# Patient Record
Sex: FEMALE | Race: BLACK OR AFRICAN AMERICAN | NOT HISPANIC OR LATINO | Employment: OTHER
[De-identification: names, ages, dates, MRNs, and addresses within clinical notes are randomized per-mention and may not be internally consistent; named-entity substitution may affect disease eponyms.]

---

## 2024-01-31 ENCOUNTER — TRANSCRIBE ORDERS (OUTPATIENT)
Dept: SCHEDULING | Age: 74
End: 2024-01-31
Payer: MEDICARE

## 2024-01-31 DIAGNOSIS — R60.0 LOCALIZED EDEMA: Primary | ICD-10-CM

## 2024-02-06 ENCOUNTER — HOSPITAL ENCOUNTER (EMERGENCY)
Facility: HOSPITAL | Age: 74
Discharge: HOME | End: 2024-02-06
Attending: EMERGENCY MEDICINE
Payer: MEDICARE

## 2024-02-06 ENCOUNTER — APPOINTMENT (OUTPATIENT)
Dept: RADIOLOGY | Facility: HOSPITAL | Age: 74
End: 2024-02-06
Payer: MEDICARE

## 2024-02-06 VITALS
WEIGHT: 140 LBS | BODY MASS INDEX: 21.97 KG/M2 | DIASTOLIC BLOOD PRESSURE: 73 MMHG | TEMPERATURE: 98.3 F | RESPIRATION RATE: 16 BRPM | HEART RATE: 68 BPM | SYSTOLIC BLOOD PRESSURE: 110 MMHG | OXYGEN SATURATION: 100 % | HEIGHT: 67 IN

## 2024-02-06 DIAGNOSIS — S32.010A CLOSED COMPRESSION FRACTURE OF BODY OF L1 VERTEBRA (MULTI): Primary | ICD-10-CM

## 2024-02-06 PROCEDURE — 99284 EMERGENCY DEPT VISIT MOD MDM: CPT | Performed by: EMERGENCY MEDICINE

## 2024-02-06 PROCEDURE — 72100 X-RAY EXAM L-S SPINE 2/3 VWS: CPT | Mod: FOREIGN READ | Performed by: RADIOLOGY

## 2024-02-06 PROCEDURE — 2500000004 HC RX 250 GENERAL PHARMACY W/ HCPCS (ALT 636 FOR OP/ED)

## 2024-02-06 PROCEDURE — 99284 EMERGENCY DEPT VISIT MOD MDM: CPT | Mod: 25 | Performed by: EMERGENCY MEDICINE

## 2024-02-06 PROCEDURE — 2500000001 HC RX 250 WO HCPCS SELF ADMINISTERED DRUGS (ALT 637 FOR MEDICARE OP)

## 2024-02-06 PROCEDURE — 96372 THER/PROPH/DIAG INJ SC/IM: CPT

## 2024-02-06 PROCEDURE — 72100 X-RAY EXAM L-S SPINE 2/3 VWS: CPT

## 2024-02-06 PROCEDURE — 72072 X-RAY EXAM THORAC SPINE 3VWS: CPT

## 2024-02-06 PROCEDURE — 72072 X-RAY EXAM THORAC SPINE 3VWS: CPT | Mod: FOREIGN READ | Performed by: RADIOLOGY

## 2024-02-06 PROCEDURE — 71046 X-RAY EXAM CHEST 2 VIEWS: CPT

## 2024-02-06 PROCEDURE — 71046 X-RAY EXAM CHEST 2 VIEWS: CPT | Mod: FOREIGN READ | Performed by: RADIOLOGY

## 2024-02-06 RX ORDER — ACETAMINOPHEN 325 MG/1
650 TABLET ORAL ONCE
Status: COMPLETED | OUTPATIENT
Start: 2024-02-06 | End: 2024-02-06

## 2024-02-06 RX ORDER — IBUPROFEN 600 MG/1
600 TABLET ORAL EVERY 6 HOURS PRN
Qty: 120 TABLET | Refills: 0 | Status: SHIPPED | OUTPATIENT
Start: 2024-02-06 | End: 2024-03-07

## 2024-02-06 RX ORDER — KETOROLAC TROMETHAMINE 15 MG/ML
INJECTION, SOLUTION INTRAMUSCULAR; INTRAVENOUS
Status: COMPLETED
Start: 2024-02-06 | End: 2024-02-06

## 2024-02-06 RX ORDER — KETOROLAC TROMETHAMINE 15 MG/ML
7.5 INJECTION, SOLUTION INTRAMUSCULAR; INTRAVENOUS ONCE
Status: COMPLETED | OUTPATIENT
Start: 2024-02-06 | End: 2024-02-06

## 2024-02-06 RX ORDER — KETOROLAC TROMETHAMINE 15 MG/ML
7.5 INJECTION, SOLUTION INTRAMUSCULAR; INTRAVENOUS ONCE
Status: DISCONTINUED | OUTPATIENT
Start: 2024-02-06 | End: 2024-02-06

## 2024-02-06 RX ORDER — ACETAMINOPHEN 500 MG
1000 TABLET ORAL EVERY 6 HOURS PRN
Qty: 30 TABLET | Refills: 0 | Status: SHIPPED | OUTPATIENT
Start: 2024-02-06 | End: 2024-02-16

## 2024-02-06 RX ORDER — MELOXICAM 7.5 MG/1
7.5 TABLET ORAL DAILY
Status: DISCONTINUED | OUTPATIENT
Start: 2024-02-06 | End: 2024-02-06 | Stop reason: HOSPADM

## 2024-02-06 RX ADMIN — MELOXICAM 7.5 MG: 7.5 TABLET ORAL at 19:38

## 2024-02-06 RX ADMIN — ACETAMINOPHEN 650 MG: 325 TABLET ORAL at 16:16

## 2024-02-06 RX ADMIN — KETOROLAC TROMETHAMINE 7.5 MG: 15 INJECTION, SOLUTION INTRAMUSCULAR; INTRAVENOUS at 16:17

## 2024-02-06 ASSESSMENT — COLUMBIA-SUICIDE SEVERITY RATING SCALE - C-SSRS
6. HAVE YOU EVER DONE ANYTHING, STARTED TO DO ANYTHING, OR PREPARED TO DO ANYTHING TO END YOUR LIFE?: NO
2. HAVE YOU ACTUALLY HAD ANY THOUGHTS OF KILLING YOURSELF?: NO
1. IN THE PAST MONTH, HAVE YOU WISHED YOU WERE DEAD OR WISHED YOU COULD GO TO SLEEP AND NOT WAKE UP?: NO

## 2024-02-06 NOTE — DISCHARGE INSTRUCTIONS
You are seen the emergency department today for back pain following a fall down stairs at your home.  Chest x-rays revealed a compression fracture of your L1 vertebrae.  You are given pain medication in the department, to which he responded well.  You are also given prescriptions for Motrin and Tylenol to be taken for pain control as prescribed for pain relief.  You are also given contact information for an Ortho spine clinic that you can follow-up with as you wish.  As discussed, use pain control and avoid strenuous activity for the next 2 weeks and please return to the emergency department should you have any new, worsening, concerning symptoms.

## 2024-02-06 NOTE — ED PROVIDER NOTES
HPI   Chief Complaint   Patient presents with   • Fall       HPI  Patient is a 73-year-old female with a past medical history of HFrEF on Eliquis, hypertension, dementia who presents to the emergency department following a fall.  Patient states that she fell down 4 or 5 steps yesterday around 9:45 PM when her dog started fighting and she tried to stop them.  Patient states that she did not hit her head and did not lose consciousness and has no amnesia surrounding the events.  Patient states that she cannot remember exactly striking her back but has pain in the central lower part of her back.  Patient denies any headaches, vision changes, tinnitus, shortness of breath or chest pain, changes in strength or sensation, abdominal pain, nausea or vomiting, fevers.  Patient states that she was able to walk afterwards, albeit gingerly due to the lower back pain.  She did not come into the emergency department immediately but decided to today when her back pain persisted.  She denies any history of back pain or surgeries.  Patient states she has no changes in bowel or urinary habits and has seen no blood in her stool or urine.  Patient does state that she has had a few episodes of shortness of breath due to pain when trying to ambulate.  Patient's daughter accompanies her in the ED and corroborates her story.             [place] Coma Scale Score: 15                     Patient History   History reviewed. No pertinent past medical history.  History reviewed. No pertinent surgical history.  No family history on file.  Social History     Tobacco Use   • Smoking status: Not on file   • Smokeless tobacco: Not on file   Substance Use Topics   • Alcohol use: Not on file   • Drug use: Not on file       Physical Exam   ED Triage Vitals [02/06/24 1242]   Temperature Heart Rate Respirations BP   36.8 °C (98.3 °F) 68 16 110/73      Pulse Ox Temp Source Heart Rate Source Patient Position   100 % Temporal -- --      BP Location FiO2 (%)      Left arm --       Physical Exam  Vitals and nursing note reviewed.   Constitutional:       General: She is not in acute distress.     Appearance: She is well-developed.   HENT:      Head: Normocephalic and atraumatic.   Eyes:      Conjunctiva/sclera: Conjunctivae normal.   Cardiovascular:      Rate and Rhythm: Normal rate and regular rhythm.      Heart sounds: No murmur heard.  Pulmonary:      Effort: Pulmonary effort is normal. No respiratory distress.      Breath sounds: Normal breath sounds.   Abdominal:      Palpations: Abdomen is soft.      Tenderness: There is no abdominal tenderness.   Musculoskeletal:         General: No swelling.      Cervical back: Neck supple. No tenderness.      Thoracic back: No tenderness.      Lumbar back: Tenderness present.      Comments: Tenderness to palpation of the lower lumbar area L3-L5 with no step-offs or deformity noted.   Skin:     General: Skin is warm and dry.      Capillary Refill: Capillary refill takes less than 2 seconds.   Neurological:      Mental Status: She is alert and oriented to person, place, and time.      GCS: GCS eye subscore is 4. GCS verbal subscore is 5. GCS motor subscore is 6.      Cranial Nerves: Cranial nerves 2-12 are intact.      Sensory: Sensation is intact.      Motor: Motor function is intact.   Psychiatric:         Mood and Affect: Mood normal.         Speech: Speech normal.         Behavior: Behavior normal. Behavior is cooperative.         ED Course & MDM   ED Course as of 02/06/24 1819   Tue Feb 06, 2024 1816 XR thoracic spine 3 views [DW]      ED Course User Index  [DW] Ok Theodore DO         Diagnoses as of 02/06/24 1819   Closed compression fracture of body of L1 vertebra (CMS/HCC)       Medical Decision Making  Patient is a 73-year-old female with a past medical history of HFrEF on Eliquis, hypertension, dementia who presents to the emergency department following a fall.  Patient was neurologically intact with no paraspinal  pain, but with significant pain in the central spinous process area of L3-L5 region.  Given no loss of consciousness or head strike along with her neuroexam, is little concern for a head bleed.  An x-ray of her lumbar and thoracic spine was ordered.  Due to patient's shortness of breath with ambulation, a chest x-ray was also ordered.  Patient given to 750 mg Tylenol and 7.5 mg of Toradol for pain control..  Patient's vitals are stable and within normal limits.  Independent interpretation of patient's EKG showed first-degree AV block with a OK interval of 310 ms, a sinus rhythm, normal axis, normal R wave progression, QTc of 458 ms, no evidence of ischemia or infarction and T wave inversions in 3, aVF, V6.  Patient's chest x-ray showed cardiomegaly remains but there does not appear to be any edema or consolidation in the chest.  Patient's x-ray of her lumbar and thoracic spine showed:  There is a mild thoracic scoliosis convex to the right but no acute  abnormalities are seen in the thoracic spine.  There is an approximately 10% compression of the superior endplate of  L1 of uncertain age..  The other vertebrae are preserved in height.   There is disc space narrowing at L3-4 and a grade 1 anterolisthesis of  L4 on L5.  Patient given a dose of Mobic for further pain control to discharge as she will not be able to  prescriptions before tomorrow.  Patient informed of her compression fracture and a discussion was had about pain control and reducing activity along with being careful at home.  Patient discharged with strict return precautions and with prescriptions for Motrin and Tylenol for pain control.  Patient also given information for Ortho spine clinic for further follow-up.  Patient understood and was agreeable with the plan.    Procedure  Procedures none     Ok Theodore DO  Resident  02/06/24 4992

## 2024-02-06 NOTE — ED TRIAGE NOTES
Pt had a fall last night down 4 steps while taking her dog out. Pt +thinners (eliquis), -LOC. Was witnessed unknown if hit head. No trauma activation per DR Andersen. VSS in triage. Pt at baseline mentation.

## 2024-02-20 ENCOUNTER — APPOINTMENT (OUTPATIENT)
Dept: RADIOLOGY | Facility: HOSPITAL | Age: 74
End: 2024-02-20
Payer: MEDICARE

## 2024-02-20 ENCOUNTER — HOSPITAL ENCOUNTER (OUTPATIENT)
Facility: HOSPITAL | Age: 74
Setting detail: OBSERVATION
Discharge: HOME | End: 2024-02-21
Attending: EMERGENCY MEDICINE | Admitting: EMERGENCY MEDICINE
Payer: MEDICARE

## 2024-02-20 DIAGNOSIS — I10 PRIMARY HYPERTENSION: ICD-10-CM

## 2024-02-20 DIAGNOSIS — R26.2 DIFFICULTY IN WALKING: ICD-10-CM

## 2024-02-20 DIAGNOSIS — Z87.891 FORMER SMOKER: ICD-10-CM

## 2024-02-20 DIAGNOSIS — Z79.01 ANTICOAGULATED: ICD-10-CM

## 2024-02-20 DIAGNOSIS — F03.A0 MILD DEMENTIA WITHOUT BEHAVIORAL DISTURBANCE, PSYCHOTIC DISTURBANCE, MOOD DISTURBANCE, OR ANXIETY, UNSPECIFIED DEMENTIA TYPE (MULTI): ICD-10-CM

## 2024-02-20 DIAGNOSIS — S32.010D COMPRESSION FRACTURE OF L1 VERTEBRA WITH ROUTINE HEALING, SUBSEQUENT ENCOUNTER: Primary | ICD-10-CM

## 2024-02-20 DIAGNOSIS — Z91.81 AT RISK FOR FALL DUE TO COMORBID CONDITION: ICD-10-CM

## 2024-02-20 DIAGNOSIS — E78.5 HYPERLIPIDEMIA, UNSPECIFIED HYPERLIPIDEMIA TYPE: ICD-10-CM

## 2024-02-20 PROBLEM — S32.000A LUMBAR COMPRESSION FRACTURE, CLOSED, INITIAL ENCOUNTER (MULTI): Status: ACTIVE | Noted: 2024-02-20

## 2024-02-20 PROBLEM — M54.50 BACK PAIN AT L4-L5 LEVEL: Status: ACTIVE | Noted: 2024-02-20

## 2024-02-20 PROCEDURE — 72100 X-RAY EXAM L-S SPINE 2/3 VWS: CPT

## 2024-02-20 PROCEDURE — 2500000001 HC RX 250 WO HCPCS SELF ADMINISTERED DRUGS (ALT 637 FOR MEDICARE OP): Mod: SE

## 2024-02-20 PROCEDURE — G0378 HOSPITAL OBSERVATION PER HR: HCPCS

## 2024-02-20 PROCEDURE — 99284 EMERGENCY DEPT VISIT MOD MDM: CPT | Performed by: EMERGENCY MEDICINE

## 2024-02-20 PROCEDURE — 72100 X-RAY EXAM L-S SPINE 2/3 VWS: CPT | Performed by: RADIOLOGY

## 2024-02-20 PROCEDURE — 2500000002 HC RX 250 W HCPCS SELF ADMINISTERED DRUGS (ALT 637 FOR MEDICARE OP, ALT 636 FOR OP/ED): Mod: SE | Performed by: PHYSICIAN ASSISTANT

## 2024-02-20 PROCEDURE — 2500000001 HC RX 250 WO HCPCS SELF ADMINISTERED DRUGS (ALT 637 FOR MEDICARE OP): Mod: SE | Performed by: PHYSICIAN ASSISTANT

## 2024-02-20 PROCEDURE — 2500000005 HC RX 250 GENERAL PHARMACY W/O HCPCS: Mod: SE

## 2024-02-20 PROCEDURE — 99285 EMERGENCY DEPT VISIT HI MDM: CPT

## 2024-02-20 RX ORDER — IBUPROFEN 600 MG/1
600 TABLET ORAL EVERY 6 HOURS PRN
Status: DISCONTINUED | OUTPATIENT
Start: 2024-02-20 | End: 2024-02-21 | Stop reason: HOSPADM

## 2024-02-20 RX ORDER — POLYETHYLENE GLYCOL 3350 17 G/17G
17 POWDER, FOR SOLUTION ORAL DAILY
Status: DISCONTINUED | OUTPATIENT
Start: 2024-02-21 | End: 2024-02-21 | Stop reason: HOSPADM

## 2024-02-20 RX ORDER — VARENICLINE TARTRATE 1 MG/1
1 TABLET, FILM COATED ORAL EVERY MORNING
Status: COMPLETED | OUTPATIENT
Start: 2024-02-21 | End: 2024-02-21

## 2024-02-20 RX ORDER — AMIODARONE HYDROCHLORIDE 200 MG/1
200 TABLET ORAL EVERY MORNING
Status: DISCONTINUED | OUTPATIENT
Start: 2024-02-21 | End: 2024-02-21 | Stop reason: HOSPADM

## 2024-02-20 RX ORDER — CYCLOBENZAPRINE HCL 10 MG
10 TABLET ORAL ONCE
Status: COMPLETED | OUTPATIENT
Start: 2024-02-20 | End: 2024-02-20

## 2024-02-20 RX ORDER — LOSARTAN POTASSIUM 50 MG/1
100 TABLET ORAL EVERY MORNING
Status: DISCONTINUED | OUTPATIENT
Start: 2024-02-21 | End: 2024-02-21 | Stop reason: HOSPADM

## 2024-02-20 RX ORDER — ATORVASTATIN CALCIUM 80 MG/1
80 TABLET, FILM COATED ORAL NIGHTLY
Status: DISCONTINUED | OUTPATIENT
Start: 2024-02-20 | End: 2024-02-21 | Stop reason: HOSPADM

## 2024-02-20 RX ORDER — MIRTAZAPINE 15 MG/1
7.5 TABLET, FILM COATED ORAL NIGHTLY
Status: DISCONTINUED | OUTPATIENT
Start: 2024-02-20 | End: 2024-02-21 | Stop reason: HOSPADM

## 2024-02-20 RX ORDER — FUROSEMIDE 20 MG/1
20 TABLET ORAL EVERY MORNING
Status: DISCONTINUED | OUTPATIENT
Start: 2024-02-21 | End: 2024-02-21 | Stop reason: HOSPADM

## 2024-02-20 RX ORDER — KETOROLAC TROMETHAMINE 10 MG/1
10 TABLET, FILM COATED ORAL ONCE
Status: COMPLETED | OUTPATIENT
Start: 2024-02-20 | End: 2024-02-20

## 2024-02-20 RX ORDER — HYDRALAZINE HYDROCHLORIDE 25 MG/1
50 TABLET, FILM COATED ORAL 3 TIMES DAILY
Status: DISCONTINUED | OUTPATIENT
Start: 2024-02-20 | End: 2024-02-21 | Stop reason: HOSPADM

## 2024-02-20 RX ORDER — LIDOCAINE 560 MG/1
1 PATCH PERCUTANEOUS; TOPICAL; TRANSDERMAL DAILY
Status: DISCONTINUED | OUTPATIENT
Start: 2024-02-20 | End: 2024-02-21 | Stop reason: HOSPADM

## 2024-02-20 RX ORDER — SERTRALINE HYDROCHLORIDE 50 MG/1
50 TABLET, FILM COATED ORAL NIGHTLY
Status: DISCONTINUED | OUTPATIENT
Start: 2024-02-20 | End: 2024-02-21 | Stop reason: HOSPADM

## 2024-02-20 RX ADMIN — KETOROLAC TROMETHAMINE 10 MG: 10 TABLET, FILM COATED ORAL at 18:50

## 2024-02-20 RX ADMIN — SERTRALINE HYDROCHLORIDE 50 MG: 50 TABLET ORAL at 22:55

## 2024-02-20 RX ADMIN — LIDOCAINE 1 PATCH: 4 PATCH TOPICAL at 18:49

## 2024-02-20 RX ADMIN — CYCLOBENZAPRINE 10 MG: 10 TABLET, FILM COATED ORAL at 18:50

## 2024-02-20 RX ADMIN — HYDRALAZINE HYDROCHLORIDE 50 MG: 25 TABLET ORAL at 22:55

## 2024-02-20 RX ADMIN — IBUPROFEN 600 MG: 600 TABLET ORAL at 22:56

## 2024-02-20 RX ADMIN — ATORVASTATIN CALCIUM 80 MG: 80 TABLET, FILM COATED ORAL at 22:56

## 2024-02-20 ASSESSMENT — ENCOUNTER SYMPTOMS
DIZZINESS: 0
RESPIRATORY NEGATIVE: 1
TROUBLE SWALLOWING: 0
COUGH: 0
SHORTNESS OF BREATH: 0
EYE PAIN: 0
GASTROINTESTINAL NEGATIVE: 1
AGITATION: 0
FEVER: 0
BACK PAIN: 1
SORE THROAT: 0
EYES NEGATIVE: 1
SPEECH DIFFICULTY: 0
NEUROLOGICAL NEGATIVE: 1
RHINORRHEA: 0
CONFUSION: 1
WEAKNESS: 0
DYSPHORIC MOOD: 0
CONSTIPATION: 0
NUMBNESS: 0
HEADACHES: 0
VOICE CHANGE: 0
NECK PAIN: 0
VOMITING: 0
CARDIOVASCULAR NEGATIVE: 1
CHEST TIGHTNESS: 0
CONSTITUTIONAL NEGATIVE: 1
DIARRHEA: 0
ABDOMINAL PAIN: 0
NAUSEA: 0
UNEXPECTED WEIGHT CHANGE: 0

## 2024-02-20 ASSESSMENT — LIFESTYLE VARIABLES
HAVE YOU EVER FELT YOU SHOULD CUT DOWN ON YOUR DRINKING: NO
EVER HAD A DRINK FIRST THING IN THE MORNING TO STEADY YOUR NERVES TO GET RID OF A HANGOVER: NO
HAVE PEOPLE ANNOYED YOU BY CRITICIZING YOUR DRINKING: NO
EVER FELT BAD OR GUILTY ABOUT YOUR DRINKING: NO

## 2024-02-20 ASSESSMENT — COLUMBIA-SUICIDE SEVERITY RATING SCALE - C-SSRS
1. IN THE PAST MONTH, HAVE YOU WISHED YOU WERE DEAD OR WISHED YOU COULD GO TO SLEEP AND NOT WAKE UP?: NO
6. HAVE YOU EVER DONE ANYTHING, STARTED TO DO ANYTHING, OR PREPARED TO DO ANYTHING TO END YOUR LIFE?: NO
2. HAVE YOU ACTUALLY HAD ANY THOUGHTS OF KILLING YOURSELF?: NO

## 2024-02-20 NOTE — ED TRIAGE NOTES
Patient came to ED with c/o lower back and buttock pain. Patient had mechanical fall 3 weeks ago, where her dog dragged her down stairs. Patient was seen at the time of fall and discharged with no mention of any fractures. Patient has been taking prescribed pain medication with no relief. No additional complaints. Patient not on thinners. EMS states patient was hypertensive en route.

## 2024-02-21 ENCOUNTER — HOME HEALTH ADMISSION (OUTPATIENT)
Dept: HOME HEALTH SERVICES | Facility: HOME HEALTH | Age: 74
End: 2024-02-21

## 2024-02-21 ENCOUNTER — DOCUMENTATION (OUTPATIENT)
Dept: HOME HEALTH SERVICES | Facility: HOME HEALTH | Age: 74
End: 2024-02-21

## 2024-02-21 VITALS
RESPIRATION RATE: 17 BRPM | SYSTOLIC BLOOD PRESSURE: 173 MMHG | OXYGEN SATURATION: 100 % | TEMPERATURE: 96.4 F | DIASTOLIC BLOOD PRESSURE: 70 MMHG | HEART RATE: 55 BPM

## 2024-02-21 PROCEDURE — 97161 PT EVAL LOW COMPLEX 20 MIN: CPT | Mod: GP | Performed by: PHYSICAL THERAPIST

## 2024-02-21 PROCEDURE — 2500000002 HC RX 250 W HCPCS SELF ADMINISTERED DRUGS (ALT 637 FOR MEDICARE OP, ALT 636 FOR OP/ED): Performed by: PHYSICIAN ASSISTANT

## 2024-02-21 PROCEDURE — 2500000001 HC RX 250 WO HCPCS SELF ADMINISTERED DRUGS (ALT 637 FOR MEDICARE OP): Mod: SE | Performed by: PHYSICIAN ASSISTANT

## 2024-02-21 PROCEDURE — G0378 HOSPITAL OBSERVATION PER HR: HCPCS

## 2024-02-21 PROCEDURE — 2500000005 HC RX 250 GENERAL PHARMACY W/O HCPCS: Mod: SE | Performed by: PHYSICIAN ASSISTANT

## 2024-02-21 RX ORDER — LIDOCAINE 50 MG/G
1 PATCH TOPICAL DAILY PRN
Qty: 20 PATCH | Refills: 0 | Status: SHIPPED | OUTPATIENT
Start: 2024-02-21 | End: 2024-03-12

## 2024-02-21 RX ORDER — CYCLOBENZAPRINE HCL 10 MG
10 TABLET ORAL NIGHTLY PRN
Qty: 20 TABLET | Refills: 0 | Status: SHIPPED | OUTPATIENT
Start: 2024-02-21 | End: 2024-03-12

## 2024-02-21 RX ADMIN — HYDRALAZINE HYDROCHLORIDE 50 MG: 25 TABLET ORAL at 09:45

## 2024-02-21 RX ADMIN — LIDOCAINE 1 PATCH: 4 PATCH TOPICAL at 09:41

## 2024-02-21 RX ADMIN — APIXABAN 5 MG: 5 TABLET, FILM COATED ORAL at 09:46

## 2024-02-21 RX ADMIN — LOSARTAN POTASSIUM 100 MG: 50 TABLET, FILM COATED ORAL at 09:42

## 2024-02-21 RX ADMIN — AMIODARONE HYDROCHLORIDE 200 MG: 200 TABLET ORAL at 11:36

## 2024-02-21 RX ADMIN — FUROSEMIDE 20 MG: 20 TABLET ORAL at 09:47

## 2024-02-21 RX ADMIN — VARENICLINE TARTRATE 1 MG: 1 TABLET, FILM COATED ORAL at 09:46

## 2024-02-21 ASSESSMENT — COGNITIVE AND FUNCTIONAL STATUS - GENERAL
MOVING TO AND FROM BED TO CHAIR: A LITTLE
CLIMB 3 TO 5 STEPS WITH RAILING: A LOT
MOBILITY SCORE: 17
STANDING UP FROM CHAIR USING ARMS: A LITTLE
WALKING IN HOSPITAL ROOM: A LITTLE
MOVING FROM LYING ON BACK TO SITTING ON SIDE OF FLAT BED WITH BEDRAILS: A LITTLE
TURNING FROM BACK TO SIDE WHILE IN FLAT BAD: A LITTLE

## 2024-02-21 ASSESSMENT — PAIN SCALES - GENERAL
PAINLEVEL_OUTOF10: 7
PAINLEVEL_OUTOF10: 7

## 2024-02-21 ASSESSMENT — PAIN DESCRIPTION - LOCATION: LOCATION: BACK

## 2024-02-21 ASSESSMENT — ACTIVITIES OF DAILY LIVING (ADL): ADL_ASSISTANCE: INDEPENDENT

## 2024-02-21 ASSESSMENT — PAIN - FUNCTIONAL ASSESSMENT
PAIN_FUNCTIONAL_ASSESSMENT: 0-10
PAIN_FUNCTIONAL_ASSESSMENT: 0-10

## 2024-02-21 NOTE — PROGRESS NOTES
I was consulted by provider to arrange home health care for PT services. I spoke to the patient at bedside. The patient was agreeable to home health care, but does not remember who her PCP is or who her insurance is through. The patient told me to call her daughter Giovanni at [phone]. I called the daughter. The daughter informed me her mom has home care already through [facility]. I called [facility] liaison Lizette and she confirmed the patient's enrollment. I asked if PT services can be added to her home care. Lizette stated their PT services will evaluate the patient. Lizette requested discharge summary faxed to them at [phone]. Summary faxed to them.      Lew Brown RN

## 2024-02-21 NOTE — ED PROVIDER NOTES
Disposition Note  [facility] CLINICAL DECISION  Patient: Casie Obrien  MRN: [id number]  : 1950  Date of Evaluation: 2024  ED Provider: Jay Salazar PA-C      Limitations to history: None  Independent Historian: Yes  External Records Reviewed: Recent and relevant inpatient and outpatient notes and EMR      Subjective:    Casie Obrien is a 73 y.o. female has undergone comprehensive diagnostic evaluation and therapeutic management in accordance with the CDU guidelines for Lower back pain. Based on Mrs. Obrien`s clinical response and diagnostic information during this period of observation, it has been determined that the patient will be {discharged.    The acute evaluation included:  Orders Placed This Encounter   Procedures    XR lumbar spine 2-3 views    Referral to Home Care    Referral to Healthy at Home Program    Adult diet Regular    Activity (specify) No Restrictions    Vital Signs    Notify provider (specify parameters)    Pain Assessment    Weight on admission    Height on admission    Full code    PT eval and treat    Electrocardiogram, 12-lead PRN ACS symptoms    Insert peripheral IV    Saline lock IV    Send to CDU    Initiate observation status         Placed in observation at: 2125       Past History   No past medical history on file.  No past surgical history on file.  Social History     Socioeconomic History    Marital status: Single     Spouse name: Not on file    Number of children: Not on file    Years of education: Not on file    Highest education level: Not on file   Occupational History    Not on file   Tobacco Use    Smoking status: Not on file    Smokeless tobacco: Not on file   Substance and Sexual Activity    Alcohol use: Not on file    Drug use: Not on file    Sexual activity: Not on file   Other Topics Concern    Not on file   Social History Narrative    Not on file     Social Determinants of Health     Financial Resource Strain: Not on file   Food  Insecurity: Not on file   Transportation Needs: Not on file   Physical Activity: Not on file   Stress: Not on file   Social Connections: Not on file   Intimate Partner Violence: Not on file   Housing Stability: Not on file         Medications/Allergies     Previous Medications    IBUPROFEN 600 MG TABLET    Take 1 tablet (600 mg) by mouth every 6 hours if needed for mild pain (1 - 3).     No Known Allergies      Review of Systems  All systems reviewed and otherwise negative, except as stated above in HPI.    Diagnostics reviewed by Jay Salazar PA-C     Labs:  No results found for this or any previous visit.  Radiographs:  XR lumbar spine 2-3 views   Final Result   Compression deformity at L1 appears mildly progressed since prior   imaging 02/06/2024 with minimal osseous retropulsion as detailed.        Demineralization and degenerative changes again present.        Vascular calcifications.             MACRO:   None        Signed by: Kalpana Preston 2/20/2024 7:43 PM   Dictation workstation:   [id number]              Physical Exam     Visit Vitals  BP (!) 185/71   Pulse 54   Temp 35.8 °C (96.4 °F) (Temporal)   Resp 16   SpO2 97%       Physical exam  VS: As documented in the triage note and EMR flowsheet from this visit were reviewed.    General: Patient is AAOx3, appears well developed, well nourished, poor historian, intermittently answers questions appropriately    HEENT: head normocephalic, atraumatic, PERRLA, EOMs intact, oropharynx without erythema or exudate, buccal mucosa intact without lesions, nose is patent bilateral    Neck: supple, full ROM, negative for lymphadenopathy, JVD, thyromegaly, tracheal deviation, nuchal rigidity    Pulmonary: Clear to auscultation bilaterally, No wheezing, rales, or rhonchi, no accessory muscle use, able to speak full clear sentences    Cardiac: Normal rate and rhythm, no murmurs, rubs or gallops    GI: soft, non-tender, non-distended, normoactive bowelsounds in all four  quadrants, no masses or organomegaly, no guarding or CVA tenderness noted    Musculoskeletal: full weight bearing, TATE, no joint effusions, clubbing or edema noted    Skin: Warm, dry, intact, no lesions or rashes noted, turgor is good.    Neuro: patient follow commands, cranial nerves 2-12 grossly intact, motor strengths 5/5 upper and lower extremities, sensation are symmetrical. No focal deficits.    Psych: Appropriate mood and affect for situation      Consultants  1) N/A      Impression and Plan    In summary, Casie Obrien has been cared for according to the standard [facility] Clinical Decision Unit observation protocol for Back pain at L4-L5 level. This extended period of observation was specifically required to determine the need for hospitalization. Prior to discharge from observation, the final physical exam is documented above.     Significant events during the course of observation based on the goals of the clinical problem list include:   1) Remained stable    Based on the patient's condition and test results, the patient will be discharged    Total length of observation was 19 hours. Dr. Taylor is the CDU disposition attending.      Discharge Diagnosis  Back pain at L4-L5 level    Issues Requiring Follow-Up  See above    Discharge Meds     Your medication list        ASK your doctor about these medications        Instructions Last Dose Given Next Dose Due   acetaminophen 500 mg tablet  Commonly known as: Tylenol  Ask about: Should I take this medication?      Take 2 tablets (1,000 mg) by mouth every 6 hours if needed for mild pain (1 - 3) for up to 10 days.       ibuprofen 600 mg tablet      Take 1 tablet (600 mg) by mouth every 6 hours if needed for mild pain (1 - 3).                Test Results Pending At Discharge  Pending Labs       No current pending labs.            Hospital Course   Mrs. Obrien was admitted to the clinical decision unit for pain management and physical  therapy evaluation.  Patient has history compression L1 fracture was sustained 2 weeks ago and has been experiencing persistent pain at home.  Medical workup included plain radiograph of the lower back which revealed slight worsening/progression of previous noted L1 compression fracture on 2/6/2024.  She was treated with cyclobenzaprine, lidocaine patch and ketorolac.  Physical therapy evaluated Mrs. Obrien this morning and recommended low intensity care.  ED  engaged the patient and was able to facilitate a home care referral for PT.  Mrs. Obrien has otherwise remained clinically, hemodynamically and neurologically intact.  Plan to discharge home with instructions to follow-up with her PCP as instructed.  Patient will be advised to return to the emergency department with new or worsening symptoms or for any other concerns.  Plan of care discussed with Mrs. Obrien and her daughter who is here at the bedside and they verbalized understanding and is in agreement and will be discharged in stable condition    Outpatient Follow-Up  Future Appointments   Date Time Provider Department Center   3/22/2024  2:30 PM [facility] 1 [facility]   4/10/2024  8:30 AM Marisa Bello PA-C [id number] [facility]   4/12/2024  9:00 AM [facility] MAMMO 3 [facility]   4/29/2024 11:30 AM [facility] ECHO 3 [id number] [facility]         MARIBEL Ayers PA-C  02/21/24 1706

## 2024-02-21 NOTE — PROGRESS NOTES
Physical Therapy    Physical Therapy Evaluation    Patient Name: Casie Obrien  MRN: [id number]  Today's Date: 2/21/2024   Time Calculation  Start Time: 0947  Stop Time: 1005  Time Calculation (min): 18 min    Assessment/Plan   PT Assessment  PT Assessment Results: Decreased mobility, Impaired balance, Decreased endurance, Decreased strength  Rehab Prognosis: Good  Evaluation/Treatment Tolerance: Patient tolerated treatment well  End of Session Communication: Bedside nurse  Assessment Comment: Patient presents to PT for evaluation s/p fall and L1 compression fx. Patient was CGA for mobility, exhibited reduced activity tolerance, and demonstrated need for inc cues. Patient is appropriate for continued skilled PT while in house to address previously mentioned impairments and work toward inc functional independence and endurance.     End of Session Patient Position: Bed, 2 rail up, Alarm off, not on at start of session  IP OR SWING BED PT PLAN  Inpatient or Swing Bed: Inpatient  PT Plan  Treatment/Interventions: Bed mobility, Transfer training, Gait training, Stair training, Balance training, Endurance training, Therapeutic exercise, Therapeutic activity, Home exercise program  PT Frequency: 3 times per week  PT Discharge Recommendations: Low intensity level of continued care  PT Recommended Transfer Status: Assist x1  PT - OK to Discharge: Yes      Subjective   General Visit Information:  Reason for Referral: low back pain due to fall that resulted in compression fracture of L1 on 02/06/24  Past Medical History Relevant to Rehab: dementia, HTN, CHF, and HLD  Prior to Session Communication: Bedside nurse  Patient Position Received: Bed, 2 rail up, Alarm off, not on at start of session, Alarm off, caregiver present  General Comment: patient with intermittent confusion, but able to follow commands. BP elevated (see below) nsg informed (just gave pt BP meds) and letting care team know.   Home Living:  Home Living  Lives  With: Siblings (reported living with her sister, and daughter helps at times)  Home Adaptive Equipment: Quad cane  Home Layout: Two level, Bed/bath upstairs  Home Access: Stairs to enter with rails  Entrance Stairs-Number of Steps: 6  Bathroom Equipment: None  Home Living Comments: +recent fall, does not drive  Prior Level of Function:  Prior Function Per Pt/Caregiver Report  Level of [place]: Independent with ADLs and functional transfers (shares household tasks with sister, but sister does most per patient)  Receives Help From: Family  ADL Assistance: Independent  Homemaking Assistance: Needs assistance  Ambulatory Assistance:  (use of QC for ambulation)  Gait: Assistive device  Precautions:     Vital Signs:  Vital Signs  BP: (!) 235/85 (197/80)  BP Location: Right arm  Patient Position: Lying    Objective   Lines/Tubes/Drains:     Continuous Medications/Drips:     Oxygen:    Pain:  Pain Assessment  Pain Assessment: 0-10  Pain Score: 7  Pain Location: Back  Cognition:  Cognition  Overall Cognitive Status:  (intermittent confusion)  Processing Speed: Delayed      Extremity/Trunk Assessments:  Strength:  Strength Comments: WFL  RUE   RUE : Within Functional Limits  LUE   LUE: Within Functional Limits  RLE   RLE : Within Functional Limits  LLE   LLE : Within Functional Limits    General Assessments:  Strength  Strength Comments: WFL               Static Sitting Balance  Static Sitting-Balance Support: No upper extremity supported  Static Sitting-Level of Assistance: Close supervision  Static Standing Balance  Static Standing-Balance Support: Right upper extremity supported  Static Standing-Level of Assistance: Close supervision  Static Standing-Comment/Number of Minutes: no LOB or sway  Dynamic Standing Balance  Dynamic Standing-Balance Support: Right upper extremity supported  Dynamic Standing-Balance: Turning  Dynamic Standing-Comments: no LOB, mild sway    Functional Assessments:  Bed Mobility  Bed Mobility:  Yes  Bed Mobility 1  Bed Mobility 1: Supine to sitting  Level of Assistance 1: Contact guard  Bed Mobility Comments 1: cues for log roll  Bed Mobility 2  Bed Mobility  2: Sitting to supine  Level of Assistance 2: Contact guard  Bed Mobility Comments 2: cues for log roll  Transfers  Transfer: Yes  Transfer 1  Transfer From 1: Bed to  Transfer to 1: Stand  Technique 1: Sit to stand  Transfer Device 1: Quad cane  Transfer Level of Assistance 1: Contact guard  Transfers 2  Transfer From 2: Stand to  Transfer to 2: Bed  Technique 2: Stand to sit  Transfer Device 2: Quad cane  Transfer Level of Assistance 2: Contact guard  Ambulation/Gait Training  Ambulation/Gait Training Performed: Yes  Ambulation/Gait Training 1  Surface 1: Level tile  Device 1: Small base quad cane  Assistance 1: Close supervision  Quality of Gait 1: Narrow base of support, Decreased step length (slow juan carlos)  Comments/Distance (ft) 1: 100          Outcome Measures:  [facility] Basic Mobility  Turning from your back to your side while in a flat bed without using bedrails: A little  Moving from lying on your back to sitting on the side of a flat bed without using bedrails: A little  Moving to and from bed to chair (including a wheelchair): A little  Standing up from a chair using your arms (e.g. wheelchair or bedside chair): A little  To walk in hospital room: A little  Climbing 3-5 steps with railing: A lot  Basic Mobility - Total Score: 17                               Encounter Problems       Encounter Problems (Active)       PT Problem       patient will perform supine <> sit with mod I for increased independence with bed mobility upon DC  (Progressing)       Start:  02/21/24    Expected End:  03/13/24            patient will perform STS transfer with Mod I for increased independence with transfers upon DC  (Progressing)       Start:  02/21/24    Expected End:  03/13/24            patient will ambulate 300 feet with use of QC and Mod I in order to  demonstrate ability to manage community distances.  (Progressing)       Start:  02/21/24    Expected End:  03/13/24            Patient will perform 1 flight of steps with use of HR and SB assist in order to safely get to/from each floor of home.   (Progressing)       Start:  02/21/24    Expected End:  03/13/24                   Education Documentation  Precautions, taught by Alie David PT at 2/21/2024 11:14 AM.  Learner: Patient  Readiness: Acceptance  Method: Explanation, Demonstration  Response: Needs Reinforcement    Body Mechanics, taught by Alie David PT at 2/21/2024 11:14 AM.  Learner: Patient  Readiness: Acceptance  Method: Explanation, Demonstration  Response: Needs Reinforcement    Home Exercise Program, taught by Alie David PT at 2/21/2024 11:14 AM.  Learner: Patient  Readiness: Acceptance  Method: Explanation, Demonstration  Response: Needs Reinforcement    Mobility Training, taught by Alie David PT at 2/21/2024 11:14 AM.  Learner: Patient  Readiness: Acceptance  Method: Explanation, Demonstration  Response: Needs Reinforcement    Precautions, taught by Alie aDvid PT at 2/21/2024 11:14 AM.  Learner: Patient  Readiness: Acceptance  Method: Explanation, Demonstration  Response: Verbalizes Understanding, Demonstrated Understanding    Body Mechanics, taught by Alie David PT at 2/21/2024 11:14 AM.  Learner: Patient  Readiness: Acceptance  Method: Explanation, Demonstration  Response: Verbalizes Understanding, Demonstrated Understanding    Home Exercise Program, taught by Alie David PT at 2/21/2024 11:14 AM.  Learner: Patient  Readiness: Acceptance  Method: Explanation, Demonstration  Response: Verbalizes Understanding, Demonstrated Understanding    Mobility Training, taught by Alie David PT at 2/21/2024 11:14 AM.  Learner: Patient  Readiness: Acceptance  Method: Explanation, Demonstration  Response: Verbalizes Understanding, Demonstrated  Understanding    Education Comments  No comments found.            02/21/24 at 11:16 AM   Alie David, PT   Rehab Office: [phone]

## 2024-02-21 NOTE — HH CARE COORDINATION
Home Care received a referral for Nursing and Physical Therapy. Unfortunately, we are unable to accept and process the referral at this time.    Patients, please reach out to the referring provider or your PCP to assist in obtaining an alternative home care agency and/or guidance to meet your needs.    Providers, please reach out to  Home Care with any questions regarding the declined referral.

## 2024-02-21 NOTE — H&P
History Of Present Illness  Casie Obrien is a 73 y.o. female presenting with low back pain due to fall that resulted in compression fracture of L1 on 02/06/24 such that she is having difficulty walking. Pt has PMHx of dementia, HTN, CHF, and HLD resides in home with her daughter. She has been using oxycodone and ibuprofen without relief. Pt denies radiation of the pain, numbness, weakness, or incontinence. She uses a cane to help her walk at home.  ED staff requesting pain control and PT evaluation.  Emergent evaluation in the ED included xray that re-demonstrated compression fracture with slight progression/ worsening.     Past Medical History  Dementia  HTN  HLD  CHF  Anticoagulated on Eliquis    Surgical History  Reviewed, noncontributory      Social History  Former smoker    Family History  Reviewed, noncontributory     Allergies  Patient has no known allergies.    Review of Systems   Constitutional: Negative.  Negative for fever and unexpected weight change.   HENT: Negative.  Negative for congestion, drooling, rhinorrhea, sore throat, trouble swallowing and voice change.    Eyes: Negative.  Negative for pain and visual disturbance.   Respiratory: Negative.  Negative for cough, chest tightness and shortness of breath.    Cardiovascular: Negative.  Negative for chest pain.   Gastrointestinal: Negative.  Negative for abdominal pain, constipation, diarrhea, nausea and vomiting.   Musculoskeletal:  Positive for back pain. Negative for neck pain.   Skin: Negative.  Negative for rash.   Neurological: Negative.  Negative for dizziness, speech difficulty, weakness, numbness and headaches.   Psychiatric/Behavioral:  Positive for confusion. Negative for agitation, dysphoric mood, self-injury and suicidal ideas.    All other systems reviewed and are negative.       Physical Exam  Vitals and nursing note reviewed.   Constitutional:       General: She is not in acute distress.     Appearance: Normal appearance. She is  well-developed. She is not ill-appearing, toxic-appearing or diaphoretic.   HENT:      Head: Normocephalic and atraumatic.      Right Ear: Tympanic membrane normal.      Left Ear: Tympanic membrane normal.      Nose: Nose normal. No congestion or rhinorrhea.      Mouth/Throat:      Mouth: Mucous membranes are moist.      Pharynx: Oropharynx is clear.   Eyes:      Extraocular Movements: Extraocular movements intact.      Pupils: Pupils are equal, round, and reactive to light.   Cardiovascular:      Rate and Rhythm: Normal rate and regular rhythm.      Heart sounds: No murmur heard.  Pulmonary:      Effort: Pulmonary effort is normal. No respiratory distress.      Breath sounds: Normal breath sounds.   Abdominal:      General: Bowel sounds are normal. There is no distension.      Palpations: Abdomen is soft.      Tenderness: There is no abdominal tenderness. There is no guarding or rebound.   Musculoskeletal:      Cervical back: Neck supple. Tenderness and bony tenderness present. No rigidity. Decreased range of motion.        Back:       Right lower leg: No edema.      Left lower leg: No edema.      Comments: Uses a cane for assistance with ambulation.   Skin:     General: Skin is warm and dry.      Capillary Refill: Capillary refill takes less than 2 seconds.   Neurological:      General: No focal deficit present.      Mental Status: She is alert and oriented to person, place, and time. Mental status is at baseline.      GCS: GCS eye subscore is 4. GCS verbal subscore is 5. GCS motor subscore is 6.      Cranial Nerves: Cranial nerves 2-12 are intact. No cranial nerve deficit.      Sensory: Sensation is intact.      Motor: Motor function is intact.      Coordination: Coordination is intact.      Gait: Gait abnormal.      Comments: Speech clear, thoughts concise.  Pleasant. Cooperative.    Psychiatric:         Attention and Perception: Attention normal.         Mood and Affect: Mood normal.         Speech: Speech  normal.         Behavior: Behavior is cooperative.         Cognition and Memory: Cognition normal.         Judgment: Judgment normal.          Last Recorded Vitals  Blood pressure 132/75, pulse 57, temperature 35.8 °C (96.4 °F), temperature source Temporal, resp. rate 18, SpO2 100 %.    Relevant Results  No results found for this or any previous visit (from the past 24 hour(s)).      IMPRESSION:  Compression deformity at L1 appears mildly progressed since prior  imaging 02/06/2024 with minimal osseous retropulsion as detailed.      Demineralization and degenerative changes again present.      Vascular calcifications.          MACRO:  None      Signed by: Kalpana Preston 2/20/2024 7:43 PM       Assessment/Plan   Principal Problem:    Back pain at L4-L5 level  Active Problems:    Lumbar compression fracture, closed, initial encounter (CMS/[facility])  -- analgesics as indicated. Pt is anticoagulated with Eliquis.  2.  Anticoagulated using Eliquis  3.  Dementia. Pt is A&Ox3, lives in home with daughter.  4.  HTN:  continue daily meds  5.  HLD:  atorvastatin  6.  CHF:  Lasix           I spent 60 minutes in the professional and overall care of this patient.      Kalpesh Ulloa PA-C

## 2024-02-21 NOTE — ED PROVIDER NOTES
HPI   Chief Complaint   Patient presents with    Back Pain       HPI     Casie Obrien is a 73 year old male with a past medical history of HFrEF on Eliquis, hypertension, dementia who presents to the emergency department for low back pain. Per the patient's daughter who was at bedside, the patient fell down 4 or 5 steps 2 weeks ago prior to her walking her dog and states that she has been having low back pain since the incident. The patient states that since the fall she has had increased lower back pain. She states the pain stays localized in her lower back and does not radiate. She has tried to take percocet and tylenol at home but did not seem to get any relief. Otherwise, she denies any other symptoms at this time. No headaches, vision changes, fevers, chills, chest pain, shortness of breath, bowel or bladder changes, numbness, tingling or weakness.                Frank Coma Scale Score: 15                     Patient History   No past medical history on file.  No past surgical history on file.  No family history on file.  Social History     Tobacco Use    Smoking status: Not on file    Smokeless tobacco: Not on file   Substance Use Topics    Alcohol use: Not on file    Drug use: Not on file       Physical Exam   ED Triage Vitals [02/20/24 1756]   Temperature Heart Rate Respirations BP   35.8 °C (96.4 °F) 57 18 132/75      Pulse Ox Temp Source Heart Rate Source Patient Position   100 % Temporal -- --      BP Location FiO2 (%)     -- --       Physical Exam  Vitals and nursing note reviewed.   Constitutional:       General: She is not in acute distress.     Appearance: She is not ill-appearing.   HENT:      Head: Normocephalic and atraumatic.      Right Ear: Tympanic membrane, ear canal and external ear normal.      Left Ear: Tympanic membrane, ear canal and external ear normal.      Mouth/Throat:      Mouth: Mucous membranes are moist.   Cardiovascular:      Rate and Rhythm: Normal rate and regular rhythm.       Pulses: Normal pulses.      Heart sounds: Normal heart sounds. No murmur heard.     No gallop.   Pulmonary:      Effort: Pulmonary effort is normal.      Breath sounds: Normal breath sounds.   Abdominal:      General: Bowel sounds are normal.      Palpations: Abdomen is soft.      Tenderness: There is no abdominal tenderness.   Musculoskeletal:         General: Tenderness and signs of injury present. Normal range of motion.      Cervical back: Neck supple.      Comments: Pain to palpation in the lumbar spinal region. No step off deformities or paraspinal tenderness noted.    Skin:     General: Skin is warm and dry.      Capillary Refill: Capillary refill takes less than 2 seconds.   Neurological:      General: No focal deficit present.      Mental Status: She is alert and oriented to person, place, and time.      Cranial Nerves: No cranial nerve deficit.      Sensory: No sensory deficit.      Motor: No weakness.   Psychiatric:         Mood and Affect: Mood normal.         Behavior: Behavior normal.         ED Course & MDM   Diagnoses as of 02/23/24 2203   Compression fracture of L1 vertebra with routine healing, subsequent encounter   Difficulty in walking   At risk for fall due to comorbid condition   Primary hypertension   Hyperlipidemia, unspecified hyperlipidemia type   Mild dementia without behavioral disturbance, psychotic disturbance, mood disturbance, or anxiety, unspecified dementia type (CMS/HCC)   Anticoagulated   Former smoker       Medical Decision Making  Casie Obrien is a 73 year old male with a past medical history of HFrEF on Eliquis, hypertension, dementia who presents to the emergency department for low back pain. Upon arrival to the ED, patient was HDS. Given the patient's symptoms, we proceeded with an x-ray of the lumbar spine which demonstrated compression deformities at L1 which appeared mildly worsened since prior imaging that was completed on 2/6/2024. The patient was given Toradol and  flexeril for pain control. With the patient being unable to walk properly due to pain that is not well controlled, we felt it was appropriate to admit the patient for PT follow up. Patient was signed off prior to admission to the medicine floor.       Procedure  Procedures     Adryan Medina MD  Resident  02/23/24 2228    Emergency Medicine Attending Attestation:     Diagnoses as of 02/29/24 1657   Compression fracture of L1 vertebra with routine healing, subsequent encounter   Difficulty in walking   At risk for fall due to comorbid condition   Primary hypertension   Hyperlipidemia, unspecified hyperlipidemia type   Mild dementia without behavioral disturbance, psychotic disturbance, mood disturbance, or anxiety, unspecified dementia type (CMS/HCC)   Anticoagulated   Former smoker       The patient was seen by the resident/fellow.  I have personally performed a substantive portion of the encounter.  I have seen and examined the patient; agree with the workup, evaluation, MDM, management and diagnosis.  The care plan has been discussed with the resident; I have reviewed the resident’s note and agree with the documented findings.      Patient with fall two weeks ago and now with continued back pain  Having trouble with ADLs and lives on second floor    Here with daughter who says her pain regiment has not been helping    Xrays show progressive compression fracture  Admit to CDU for pain control and PT/OT     MD Claire Zarate MD  02/29/24 9900

## 2024-02-24 ENCOUNTER — PATIENT OUTREACH (OUTPATIENT)
Dept: HOME HEALTH SERVICES | Age: 74
End: 2024-02-24
Payer: MEDICARE

## 2024-03-22 ENCOUNTER — HOSPITAL ENCOUNTER (OUTPATIENT)
Dept: RADIOLOGY | Facility: HOSPITAL | Age: 74
Discharge: HOME | End: 2024-03-22
Payer: MEDICARE

## 2024-03-22 DIAGNOSIS — J84.9 INTERSTITIAL PULMONARY DISEASE, UNSPECIFIED (MULTI): ICD-10-CM

## 2024-03-22 PROCEDURE — 71250 CT THORAX DX C-: CPT | Performed by: RADIOLOGY

## 2024-03-22 PROCEDURE — 71250 CT THORAX DX C-: CPT

## 2024-03-25 ENCOUNTER — DOCUMENTATION (OUTPATIENT)
Dept: PULMONOLOGY | Facility: HOSPITAL | Age: 74
End: 2024-03-25
Payer: MEDICARE

## 2024-03-25 NOTE — PROGRESS NOTES
Spoke with daughter and explained that the patient needs to see cardiology and get an echocardiogram.  The predominance of findings on her CT scan is heart failure with concern about aortic stenosis.  She should have a follow-up CT scan of the chest in 6 months for the nodularity and in the interim over the next 3 months make an appointment to see me.

## 2024-04-10 ENCOUNTER — OFFICE VISIT (OUTPATIENT)
Dept: ORTHOPEDIC SURGERY | Facility: CLINIC | Age: 74
End: 2024-04-10
Payer: MEDICAID

## 2024-04-10 VITALS — HEIGHT: 67 IN | BODY MASS INDEX: 21.97 KG/M2 | WEIGHT: 140 LBS

## 2024-04-10 DIAGNOSIS — S32.000A LUMBAR COMPRESSION FRACTURE, CLOSED, INITIAL ENCOUNTER (MULTI): Primary | ICD-10-CM

## 2024-04-10 PROCEDURE — 1125F AMNT PAIN NOTED PAIN PRSNT: CPT | Performed by: PHYSICIAN ASSISTANT

## 2024-04-10 PROCEDURE — 1160F RVW MEDS BY RX/DR IN RCRD: CPT | Performed by: PHYSICIAN ASSISTANT

## 2024-04-10 PROCEDURE — 99214 OFFICE O/P EST MOD 30 MIN: CPT | Performed by: PHYSICIAN ASSISTANT

## 2024-04-10 PROCEDURE — 99204 OFFICE O/P NEW MOD 45 MIN: CPT | Performed by: PHYSICIAN ASSISTANT

## 2024-04-10 PROCEDURE — 1159F MED LIST DOCD IN RCRD: CPT | Performed by: PHYSICIAN ASSISTANT

## 2024-04-10 RX ORDER — METHOCARBAMOL 500 MG/1
TABLET, FILM COATED ORAL
Qty: 60 TABLET | Refills: 2 | Status: SHIPPED | OUTPATIENT
Start: 2024-04-10

## 2024-04-10 ASSESSMENT — PAIN DESCRIPTION - DESCRIPTORS: DESCRIPTORS: SHARP;ACHING

## 2024-04-10 ASSESSMENT — PAIN - FUNCTIONAL ASSESSMENT: PAIN_FUNCTIONAL_ASSESSMENT: 0-10

## 2024-04-10 ASSESSMENT — PAIN SCALES - GENERAL: PAINLEVEL_OUTOF10: 6

## 2024-04-10 NOTE — PROGRESS NOTES
Casie is a 74-year-old female reporting clinic today for evaluation of her low back pain after fall.      She is accompanied by her daughter who states the patient does have dementia.    Approximately 3 months ago she was pulled down the steps by her dog, she fell down approximately 4 steps onto her butt.  Since the fall she has had midline low back pain around L4-5 that radiates in a beltline distribution.  She has no pain radiating down her legs.  She is ambulating with a cane which she was using prior to the fall.  She has full control of her bowel and bladder.    Treatment thus far has consisted of the use of oxycodone prescribed by her PCP.    Family, social, and medical histories are obtained and reviewed.  Current smoker.    ROS: All other systems have been reviewed and are negative except as previously noted in history of present illness.    Physical Exam:  Const: Well-appearing, well-nourished female in no distress.  Eyes: Normal appearing sclera and conjunctiva, no jaundice, pupils normal in appearance.  Resp: breathing comfortably, normal respiratory rate.  CV: No upper or lower extremity edema.  Musculoskeletal: Normal gait.  No tenderness to palpation over the spine.  Lumbar ROM is supple.  Strength exam of the lower extremities reveals 5/5 strength in all major muscle groups.  Negative straight leg raise bilaterally.  Neuro: Sensation is intact and equal bilaterally. Deep tendon reflexes are normal and symmetric.  No clonus.  Skin: Intact without any lesions, normal turgor.  Psych: Alert and oriented x3, normal mood and affect.    I personally reviewed x-rays of the lumbar spine from 2/20.  There is a mild L1 superior endplate compression fracture. There are moderate degenerative changes throughout the lumbar spine.  Disc space narrowing most prominent at L3-4.  There is an anterolisthesis of L4 on 5.    I discussed with the patient and her daughter that compression fractures are stable and best  treated conservatively to start.  She was given a referral for aquatic therapy.  A prescription for methocarbamol 500 mg was sent to her pharmacy for muscle spasms.  If she does not have improvement after 4 to 6 weeks of physical therapy she should follow-up with me at which I will consider an MRI of the lumbar spine.    **This note was dictated using speech recognition software and was not corrected for spelling or grammatical errors**

## 2024-04-12 ENCOUNTER — HOSPITAL ENCOUNTER (OUTPATIENT)
Dept: RADIOLOGY | Facility: HOSPITAL | Age: 74
Discharge: HOME | End: 2024-04-12
Payer: MEDICARE

## 2024-04-12 VITALS — WEIGHT: 147 LBS | HEIGHT: 66 IN | BODY MASS INDEX: 23.63 KG/M2

## 2024-04-12 DIAGNOSIS — Z92.21 PERSONAL HISTORY OF ANTINEOPLASTIC CHEMOTHERAPY: ICD-10-CM

## 2024-04-12 DIAGNOSIS — Z90.11 ACQUIRED ABSENCE OF RIGHT BREAST AND NIPPLE: ICD-10-CM

## 2024-04-12 DIAGNOSIS — Z12.31 ENCOUNTER FOR SCREENING MAMMOGRAM FOR MALIGNANT NEOPLASM OF BREAST: ICD-10-CM

## 2024-04-12 DIAGNOSIS — Z85.3 PERSONAL HISTORY OF MALIGNANT NEOPLASM OF BREAST: ICD-10-CM

## 2024-04-12 PROCEDURE — 77063 BREAST TOMOSYNTHESIS BI: CPT | Performed by: RADIOLOGY

## 2024-04-12 PROCEDURE — 77067 SCR MAMMO BI INCL CAD: CPT | Performed by: RADIOLOGY

## 2024-04-12 PROCEDURE — 77067 SCR MAMMO BI INCL CAD: CPT

## 2024-04-17 ENCOUNTER — HOSPITAL ENCOUNTER (OUTPATIENT)
Dept: RADIOLOGY | Facility: EXTERNAL LOCATION | Age: 74
Discharge: HOME | End: 2024-04-17
Payer: MEDICARE

## 2024-04-29 ENCOUNTER — HOSPITAL ENCOUNTER (OUTPATIENT)
Dept: CARDIOLOGY | Facility: HOSPITAL | Age: 74
Discharge: HOME | End: 2024-04-29
Payer: MEDICARE

## 2024-04-29 DIAGNOSIS — R60.0 LOCALIZED EDEMA: ICD-10-CM

## 2024-04-29 LAB
AORTIC VALVE MEAN GRADIENT: 13.3 MMHG
AORTIC VALVE PEAK VELOCITY: 2.61 M/S
AV PEAK GRADIENT: 27.3 MMHG
AVA (PEAK VEL): 1.69 CM2
AVA (VTI): 1.77 CM2
EJECTION FRACTION APICAL 4 CHAMBER: 60.3
LEFT ATRIUM VOLUME AREA LENGTH INDEX BSA: 75.3 ML/M2
LEFT VENTRICLE INTERNAL DIMENSION DIASTOLE: 5.59 CM (ref 3.5–6)
LEFT VENTRICULAR OUTFLOW TRACT DIAMETER: 1.84 CM
LV EJECTION FRACTION BIPLANE: 57 %

## 2024-04-29 PROCEDURE — 93306 TTE W/DOPPLER COMPLETE: CPT

## 2024-04-29 PROCEDURE — 93306 TTE W/DOPPLER COMPLETE: CPT | Performed by: INTERNAL MEDICINE

## 2024-05-23 PROBLEM — I48.92 PAROXYSMAL ATRIAL FLUTTER (MULTI): Status: ACTIVE | Noted: 2020-06-13

## 2024-05-23 PROBLEM — G93.40 ENCEPHALOPATHY: Status: ACTIVE | Noted: 2022-04-03
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
## 2024-05-23 PROBLEM — R26.2 DIFFICULTY WALKING: Status: ACTIVE | Noted: 2024-02-20

## 2024-05-23 PROBLEM — H25.10 NUCLEAR SENILE CATARACT: Status: ACTIVE | Noted: 2024-05-23

## 2024-05-23 PROBLEM — Z85.3 HISTORY OF MALIGNANT NEOPLASM OF BREAST: Status: ACTIVE | Noted: 2024-04-12

## 2024-05-23 PROBLEM — H52.03 HYPEROPIA OF BOTH EYES: Status: ACTIVE | Noted: 2024-05-23

## 2024-05-23 PROBLEM — H35.039 HYPERTENSIVE RETINOPATHY: Status: ACTIVE | Noted: 2024-05-23

## 2024-05-23 PROBLEM — Z90.10 ACQUIRED ABSENCE OF BREAST: Status: ACTIVE | Noted: 2024-04-12

## 2024-05-23 PROBLEM — Z86.79 HISTORY OF CEREBROVASCULAR DISEASE: Status: ACTIVE | Noted: 2024-05-23

## 2024-05-23 PROBLEM — I16.1 HYPERTENSIVE EMERGENCY: Status: ACTIVE | Noted: 2017-04-10

## 2024-05-23 PROBLEM — I51.7 CARDIOMEGALY: Status: ACTIVE | Noted: 2023-05-15

## 2024-05-23 PROBLEM — I35.1 NONRHEUMATIC AORTIC VALVE INSUFFICIENCY: Status: ACTIVE | Noted: 2022-03-12

## 2024-05-23 PROBLEM — F17.200 NICOTINE USE DISORDER: Status: ACTIVE | Noted: 2022-04-04

## 2024-05-23 PROBLEM — J00 ACUTE NASOPHARYNGITIS (COMMON COLD): Status: ACTIVE | Noted: 2024-05-23

## 2024-05-23 PROBLEM — J84.9 INTERSTITIAL LUNG DISEASE (MULTI): Status: ACTIVE | Noted: 2024-05-23

## 2024-05-23 PROBLEM — F03.A0 UNSPECIFIED DEMENTIA, MILD, WITHOUT BEHAVIORAL DISTURBANCE, PSYCHOTIC DISTURBANCE, MOOD DISTURBANCE, AND ANXIETY (MULTI): Status: ACTIVE | Noted: 2024-02-20

## 2024-05-23 PROBLEM — R06.2 WHEEZING: Status: ACTIVE | Noted: 2023-05-15

## 2024-05-23 PROBLEM — R41.0 DELIRIUM: Status: ACTIVE | Noted: 2022-03-12

## 2024-05-23 PROBLEM — I50.20 HFREF (HEART FAILURE WITH REDUCED EJECTION FRACTION) (MULTI): Status: ACTIVE | Noted: 2022-03-12

## 2024-05-28 ENCOUNTER — OFFICE VISIT (OUTPATIENT)
Dept: CARDIOLOGY | Facility: HOSPITAL | Age: 74
End: 2024-05-28
Payer: MEDICARE

## 2024-05-28 VITALS
OXYGEN SATURATION: 95 % | SYSTOLIC BLOOD PRESSURE: 101 MMHG | HEIGHT: 67 IN | WEIGHT: 144.2 LBS | DIASTOLIC BLOOD PRESSURE: 67 MMHG | HEART RATE: 66 BPM | BODY MASS INDEX: 22.63 KG/M2

## 2024-05-28 DIAGNOSIS — I10 HYPERTENSION, UNSPECIFIED TYPE: ICD-10-CM

## 2024-05-28 DIAGNOSIS — I48.3 TYPICAL ATRIAL FLUTTER (MULTI): ICD-10-CM

## 2024-05-28 DIAGNOSIS — I50.32 HEART FAILURE WITH IMPROVED EJECTION FRACTION (HFIMPEF) (MULTI): Primary | ICD-10-CM

## 2024-05-28 PROCEDURE — 99214 OFFICE O/P EST MOD 30 MIN: CPT | Performed by: INTERNAL MEDICINE

## 2024-05-28 PROCEDURE — 93005 ELECTROCARDIOGRAM TRACING: CPT | Performed by: INTERNAL MEDICINE

## 2024-05-28 RX ORDER — DEXTROMETHORPHAN HYDROBROMIDE, GUAIFENESIN 5; 100 MG/5ML; MG/5ML
650 LIQUID ORAL EVERY 8 HOURS PRN
COMMUNITY

## 2024-05-28 RX ORDER — LOSARTAN POTASSIUM 50 MG/1
100 TABLET ORAL DAILY
COMMUNITY
End: 2024-05-28 | Stop reason: WASHOUT

## 2024-05-28 RX ORDER — AMIODARONE HYDROCHLORIDE 200 MG/1
TABLET ORAL DAILY
COMMUNITY

## 2024-05-28 RX ORDER — IPRATROPIUM BROMIDE 21 UG/1
2 SPRAY, METERED NASAL EVERY 12 HOURS
COMMUNITY

## 2024-05-28 RX ORDER — LOSARTAN POTASSIUM 50 MG/1
100 TABLET ORAL DAILY
COMMUNITY

## 2024-05-28 RX ORDER — FUROSEMIDE 20 MG/1
TABLET ORAL
COMMUNITY

## 2024-05-28 RX ORDER — DIPHENHYDRAMINE HCL 25 MG
4 CAPSULE ORAL AS NEEDED
COMMUNITY

## 2024-05-28 RX ORDER — VARENICLINE TARTRATE 1 MG/1
1 TABLET, FILM COATED ORAL 2 TIMES DAILY
COMMUNITY

## 2024-05-28 RX ORDER — ATORVASTATIN CALCIUM 80 MG/1
80 TABLET, FILM COATED ORAL DAILY
COMMUNITY

## 2024-05-28 RX ORDER — DICLOFENAC SODIUM 10 MG/G
4 GEL TOPICAL 4 TIMES DAILY PRN
COMMUNITY

## 2024-05-28 RX ORDER — SERTRALINE HYDROCHLORIDE 50 MG/1
50 TABLET, FILM COATED ORAL DAILY
COMMUNITY

## 2024-05-28 RX ORDER — LIDOCAINE 50 MG/G
1 PATCH TOPICAL DAILY
COMMUNITY

## 2024-05-28 RX ORDER — MIRTAZAPINE 7.5 MG/1
7.5 TABLET, FILM COATED ORAL NIGHTLY
COMMUNITY

## 2024-05-28 RX ORDER — HYDRALAZINE HYDROCHLORIDE 50 MG/1
50 TABLET, FILM COATED ORAL 3 TIMES DAILY
COMMUNITY

## 2024-05-28 RX ORDER — LORATADINE 10 MG/1
10 TABLET ORAL DAILY
COMMUNITY

## 2024-05-28 ASSESSMENT — ENCOUNTER SYMPTOMS
FOCAL WEAKNESS: 0
MEMORY LOSS: 1
BLURRED VISION: 0
COUGH: 0
CHILLS: 0
BLOATING: 0
VOMITING: 0
WEAKNESS: 0
FEVER: 0
EYE PAIN: 0
NAUSEA: 0
BACK PAIN: 1
SNORING: 0
FLANK PAIN: 0
DYSPNEA ON EXERTION: 0
FREQUENCY: 0
ABDOMINAL PAIN: 0
SORE THROAT: 0
HEADACHES: 1

## 2024-05-28 NOTE — PROGRESS NOTES
Subjective   Casie Obrien is a 74 y.o. female.    Chief Complaint:  Establish Care    HPI  Ms Obrien is a 74 YOF with PMH of HFimpEF (EF 55% on 4/29/24), HTN, AFL s/p DCCV on 3/16/22 (on Eliquis), dementia who presented to cardiology clinic today to establish care.     She is accompanied today by her daughter who brought her medication list from PCP. Pt used to f/up with [facility] but now would like to establish care with .     She reports feeling well and denies any active symptoms. Also, no exertional symptoms of chest pain or SOB. She is able to walk to the store and back (~2-3 blocks) with her cane without developing any chest pain or SOB. Also denies orthopnea, PND, leg swelling and syncope.     She is compliant with all her meds. She gets regular refills from her PCP.       Review of Systems   Constitutional: Negative for chills and fever.   HENT:  Negative for congestion and sore throat.    Eyes:  Negative for blurred vision and pain.   Cardiovascular:  Negative for chest pain, dyspnea on exertion and leg swelling.   Respiratory:  Negative for cough and snoring.    Skin:  Negative for flushing and itching.   Musculoskeletal:  Positive for back pain. Negative for arthritis.   Gastrointestinal:  Negative for bloating, abdominal pain, nausea and vomiting.   Genitourinary:  Negative for flank pain and frequency.   Neurological:  Positive for headaches. Negative for focal weakness and weakness.   Psychiatric/Behavioral:  Positive for memory loss.        Objective   Physical Exam  Gen: Elderly  female. Awake and alert, AAOx3  CV: RRR. II/VI JEYSON best heard over RUSB  Pulm: clear to auscultation bilaterally. No coarse lung sounds  Abd: soft, non-distended. Normal active bowel sounds  Ext: warm and well-perfused. No LE edema  Psych: appropriate affect  Neuro: grossly intact sensation and motor functions.       Lab Review:   Lab Results   Component Value Date     06/14/2022    K 3.2 (L) 06/14/2022      06/14/2022    CO2 26 06/14/2022    BUN 11 06/14/2022    CREATININE 1.04 06/14/2022    GLUCOSE 81 06/14/2022    CALCIUM 9.9 06/14/2022     Lab Results   Component Value Date    WBC 4.2 (L) 06/14/2022    HGB 12.3 06/14/2022    HCT 38.0 06/14/2022    MCV 97 06/14/2022     06/14/2022       Assessment/Plan   The primary encounter diagnosis was Heart failure with improved ejection fraction (HFimpEF) (Multi). Diagnoses of Typical atrial flutter (Multi) and Hypertension, unspecified type were also pertinent to this visit.    Ms Obrien is a 74 YOF with PMH of HFimpEF (EF 55% on 4/29/24), CAD (RCA  in 2020, no previous PCI) HTN, AFL s/p DCCV on 3/16/22 (on Eliquis), dementia who presented to cardiology clinic today to establish care.     She is euvolemic and asymptomatic. BP is well controlled with current therapy and recent Echo showed improved EF to 55% and mild AI. Will continue her current therapy and repeat RFP+Mg as last was done in 2022.     #HFimpEF (EF 55% on 4/29/24)  #Mild AI  #HTN  - Continue losartan 100 mg daily  - Not on BB due to hx of bradycardia  - Consider repeat Echo next visit.     #CAD (RCA  in 2020, no previous PCI)  - Continue atorvastatin 80 mg daily  - Continue apixaban as below    #AFL s/p DCCV on 3/16/22  - Continue apixaban    Return to clinic for follow up in 1 year.

## 2024-05-28 NOTE — PATIENT INSTRUCTIONS
It was nice to see you today Ms Obrien.     - Please stop by the lab to get blood work done.   - Continue to take your medications as prescribed.   - Please return to clinic for follow up in 1 year.

## 2024-06-10 ENCOUNTER — APPOINTMENT (OUTPATIENT)
Dept: PULMONOLOGY | Facility: CLINIC | Age: 74
End: 2024-06-10
Payer: MEDICARE

## 2024-06-14 LAB
ATRIAL RATE: 62 BPM
P AXIS: 74 DEGREES
P OFFSET: 81 MS
P ONSET: 27 MS
PR INTERVAL: 380 MS
Q ONSET: 217 MS
QRS COUNT: 11 BEATS
QRS DURATION: 94 MS
QT INTERVAL: 442 MS
QTC CALCULATION(BAZETT): 448 MS
QTC FREDERICIA: 447 MS
R AXIS: 64 DEGREES
T AXIS: 270 DEGREES
T OFFSET: 438 MS
VENTRICULAR RATE: 62 BPM

## 2024-09-04 ENCOUNTER — HOSPITAL ENCOUNTER (OUTPATIENT)
Dept: RADIOLOGY | Facility: HOSPITAL | Age: 74
Discharge: HOME | End: 2024-09-04
Payer: MEDICAID

## 2024-09-04 DIAGNOSIS — R91.1 SOLITARY PULMONARY NODULE: ICD-10-CM

## 2024-09-04 PROCEDURE — 71250 CT THORAX DX C-: CPT | Performed by: STUDENT IN AN ORGANIZED HEALTH CARE EDUCATION/TRAINING PROGRAM

## 2024-09-04 PROCEDURE — 71250 CT THORAX DX C-: CPT

## 2024-09-06 ENCOUNTER — HOSPITAL ENCOUNTER (OUTPATIENT)
Dept: RADIOLOGY | Facility: CLINIC | Age: 74
Discharge: HOME | End: 2024-09-06
Payer: MEDICARE

## 2024-09-06 DIAGNOSIS — Z13.820 ENCOUNTER FOR SCREENING FOR OSTEOPOROSIS: ICD-10-CM

## 2024-09-06 PROCEDURE — 77080 DXA BONE DENSITY AXIAL: CPT

## 2024-09-06 ASSESSMENT — LIFESTYLE VARIABLES
3_OR_MORE_DRINKS_PER_DAY: N
CURRENT_SMOKER: N

## 2024-10-19 ENCOUNTER — HOSPITAL ENCOUNTER (INPATIENT)
Facility: HOSPITAL | Age: 74
End: 2024-10-19
Attending: EMERGENCY MEDICINE | Admitting: INTERNAL MEDICINE
Payer: MEDICARE

## 2024-10-19 ENCOUNTER — APPOINTMENT (OUTPATIENT)
Dept: RADIOLOGY | Facility: HOSPITAL | Age: 74
End: 2024-10-19
Payer: MEDICARE

## 2024-10-19 ENCOUNTER — CLINICAL SUPPORT (OUTPATIENT)
Dept: EMERGENCY MEDICINE | Facility: HOSPITAL | Age: 74
End: 2024-10-19
Payer: MEDICARE

## 2024-10-19 DIAGNOSIS — I35.1 NONRHEUMATIC AORTIC VALVE INSUFFICIENCY: ICD-10-CM

## 2024-10-19 DIAGNOSIS — D64.9 ANEMIA, UNSPECIFIED TYPE: ICD-10-CM

## 2024-10-19 DIAGNOSIS — D50.9 IRON DEFICIENCY ANEMIA, UNSPECIFIED IRON DEFICIENCY ANEMIA TYPE: ICD-10-CM

## 2024-10-19 DIAGNOSIS — S09.90XA CLOSED HEAD INJURY, INITIAL ENCOUNTER: ICD-10-CM

## 2024-10-19 DIAGNOSIS — Z79.01 ANTICOAGULATED: ICD-10-CM
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
## 2024-10-19 DIAGNOSIS — I50.20 HFREF (HEART FAILURE WITH REDUCED EJECTION FRACTION): ICD-10-CM

## 2024-10-19 LAB
ABO GROUP (TYPE) IN BLOOD: NORMAL
ALBUMIN SERPL BCP-MCNC: 4.1 G/DL (ref 3.4–5)
ALP SERPL-CCNC: 52 U/L (ref 33–136)
ALT SERPL W P-5'-P-CCNC: 10 U/L (ref 7–45)
ANION GAP SERPL CALC-SCNC: 13 MMOL/L (ref 10–20)
ANTIBODY SCREEN: NORMAL
APTT PPP: 28 SECONDS (ref 27–38)
AST SERPL W P-5'-P-CCNC: 27 U/L (ref 9–39)
BASOPHILS # BLD AUTO: 0.05 X10*3/UL (ref 0–0.1)
BASOPHILS NFR BLD AUTO: 1.2 %
BILIRUB DIRECT SERPL-MCNC: 0.1 MG/DL (ref 0–0.3)
BILIRUB SERPL-MCNC: 0.5 MG/DL (ref 0–1.2)
BNP SERPL-MCNC: 339 PG/ML (ref 0–99)
BUN SERPL-MCNC: 16 MG/DL (ref 6–23)
CALCIUM SERPL-MCNC: 9.3 MG/DL (ref 8.6–10.6)
CARDIAC TROPONIN I PNL SERPL HS: 48 NG/L (ref 0–34)
CARDIAC TROPONIN I PNL SERPL HS: 52 NG/L (ref 0–34)
CHLORIDE SERPL-SCNC: 102 MMOL/L (ref 98–107)
CO2 SERPL-SCNC: 26 MMOL/L (ref 21–32)
CREAT SERPL-MCNC: 0.97 MG/DL (ref 0.5–1.05)
EGFRCR SERPLBLD CKD-EPI 2021: 61 ML/MIN/1.73M*2
EOSINOPHIL # BLD AUTO: 0.17 X10*3/UL (ref 0–0.4)
EOSINOPHIL NFR BLD AUTO: 4.2 %
ERYTHROCYTE [DISTWIDTH] IN BLOOD BY AUTOMATED COUNT: 18.6 % (ref 11.5–14.5)
FERRITIN SERPL-MCNC: 20 NG/ML (ref 8–150)
FOLATE SERPL-MCNC: 9.9 NG/ML
GLUCOSE SERPL-MCNC: 82 MG/DL (ref 74–99)
HAPTOGLOB SERPL NEPH-MCNC: 95 MG/DL (ref 30–200)
HCT VFR BLD AUTO: 28.9 % (ref 36–46)
HGB BLD-MCNC: 8.4 G/DL (ref 12–16)
HGB RETIC QN: 29 PG (ref 28–38)
HOLD SPECIMEN: NORMAL
IMM GRANULOCYTES # BLD AUTO: 0.01 X10*3/UL (ref 0–0.5)
IMM GRANULOCYTES NFR BLD AUTO: 0.2 % (ref 0–0.9)
IMMATURE RETIC FRACTION: 19.6 %
INR PPP: 1.2 (ref 0.9–1.1)
IRON SATN MFR SERPL: 6 % (ref 25–45)
IRON SERPL-MCNC: 23 UG/DL (ref 35–150)
LDH SERPL L TO P-CCNC: 298 U/L (ref 84–246)
LYMPHOCYTES # BLD AUTO: 1.41 X10*3/UL (ref 0.8–3)
LYMPHOCYTES NFR BLD AUTO: 34.7 %
MAGNESIUM SERPL-MCNC: 1.96 MG/DL (ref 1.6–2.4)
MCH RBC QN AUTO: 26.6 PG (ref 26–34)
MCHC RBC AUTO-ENTMCNC: 29.1 G/DL (ref 32–36)
MCV RBC AUTO: 92 FL (ref 80–100)
MONOCYTES # BLD AUTO: 0.51 X10*3/UL (ref 0.05–0.8)
MONOCYTES NFR BLD AUTO: 12.6 %
NEUTROPHILS # BLD AUTO: 1.91 X10*3/UL (ref 1.6–5.5)
NEUTROPHILS NFR BLD AUTO: 47.1 %
NRBC BLD-RTO: 0 /100 WBCS (ref 0–0)
PLATELET # BLD AUTO: 239 X10*3/UL (ref 150–450)
POTASSIUM SERPL-SCNC: 4.6 MMOL/L (ref 3.5–5.3)
PROT SERPL-MCNC: 7.4 G/DL (ref 6.4–8.2)
PROTHROMBIN TIME: 13.4 SECONDS (ref 9.8–12.8)
RBC # BLD AUTO: 3.16 X10*6/UL (ref 4–5.2)
RETICS #: 0.04 X10*6/UL (ref 0.02–0.11)
RETICS/RBC NFR AUTO: 1.2 % (ref 0.5–2)
RH FACTOR (ANTIGEN D): NORMAL
SODIUM SERPL-SCNC: 136 MMOL/L (ref 136–145)
TIBC SERPL-MCNC: 358 UG/DL (ref 240–445)
TSH SERPL-ACNC: 0.88 MIU/L (ref 0.44–3.98)
UIBC SERPL-MCNC: 335 UG/DL (ref 110–370)
VIT B12 SERPL-MCNC: 447 PG/ML (ref 211–911)
WBC # BLD AUTO: 4.1 X10*3/UL (ref 4.4–11.3)

## 2024-10-19 PROCEDURE — 74177 CT ABD & PELVIS W/CONTRAST: CPT

## 2024-10-19 PROCEDURE — 71260 CT THORAX DX C+: CPT | Performed by: RADIOLOGY

## 2024-10-19 PROCEDURE — 2500000001 HC RX 250 WO HCPCS SELF ADMINISTERED DRUGS (ALT 637 FOR MEDICARE OP)

## 2024-10-19 PROCEDURE — 2550000001 HC RX 255 CONTRASTS: Mod: SE | Performed by: EMERGENCY MEDICINE

## 2024-10-19 PROCEDURE — 96374 THER/PROPH/DIAG INJ IV PUSH: CPT | Mod: 59

## 2024-10-19 PROCEDURE — 70450 CT HEAD/BRAIN W/O DYE: CPT | Performed by: RADIOLOGY

## 2024-10-19 PROCEDURE — 84484 ASSAY OF TROPONIN QUANT: CPT | Performed by: EMERGENCY MEDICINE

## 2024-10-19 PROCEDURE — 72125 CT NECK SPINE W/O DYE: CPT

## 2024-10-19 PROCEDURE — 2500000004 HC RX 250 GENERAL PHARMACY W/ HCPCS (ALT 636 FOR OP/ED): Mod: SE | Performed by: EMERGENCY MEDICINE

## 2024-10-19 PROCEDURE — 82728 ASSAY OF FERRITIN: CPT

## 2024-10-19 PROCEDURE — 36415 COLL VENOUS BLD VENIPUNCTURE: CPT | Performed by: EMERGENCY MEDICINE

## 2024-10-19 PROCEDURE — 72128 CT CHEST SPINE W/O DYE: CPT | Mod: RCN

## 2024-10-19 PROCEDURE — 72125 CT NECK SPINE W/O DYE: CPT | Performed by: RADIOLOGY

## 2024-10-19 PROCEDURE — 93005 ELECTROCARDIOGRAM TRACING: CPT

## 2024-10-19 PROCEDURE — 86900 BLOOD TYPING SEROLOGIC ABO: CPT

## 2024-10-19 PROCEDURE — G0378 HOSPITAL OBSERVATION PER HR: HCPCS

## 2024-10-19 PROCEDURE — 80053 COMPREHEN METABOLIC PANEL: CPT | Performed by: EMERGENCY MEDICINE

## 2024-10-19 PROCEDURE — 83615 LACTATE (LD) (LDH) ENZYME: CPT

## 2024-10-19 PROCEDURE — 99285 EMERGENCY DEPT VISIT HI MDM: CPT | Mod: 25

## 2024-10-19 PROCEDURE — 85610 PROTHROMBIN TIME: CPT | Performed by: EMERGENCY MEDICINE

## 2024-10-19 PROCEDURE — 2500000002 HC RX 250 W HCPCS SELF ADMINISTERED DRUGS (ALT 637 FOR MEDICARE OP, ALT 636 FOR OP/ED)

## 2024-10-19 PROCEDURE — 2500000004 HC RX 250 GENERAL PHARMACY W/ HCPCS (ALT 636 FOR OP/ED)

## 2024-10-19 PROCEDURE — 82746 ASSAY OF FOLIC ACID SERUM: CPT

## 2024-10-19 PROCEDURE — 71045 X-RAY EXAM CHEST 1 VIEW: CPT

## 2024-10-19 PROCEDURE — 82248 BILIRUBIN DIRECT: CPT

## 2024-10-19 PROCEDURE — 72131 CT LUMBAR SPINE W/O DYE: CPT | Mod: RCN | Performed by: RADIOLOGY

## 2024-10-19 PROCEDURE — 83550 IRON BINDING TEST: CPT

## 2024-10-19 PROCEDURE — 83735 ASSAY OF MAGNESIUM: CPT | Performed by: EMERGENCY MEDICINE

## 2024-10-19 PROCEDURE — 96375 TX/PRO/DX INJ NEW DRUG ADDON: CPT | Mod: 59

## 2024-10-19 PROCEDURE — 99223 1ST HOSP IP/OBS HIGH 75: CPT | Performed by: INTERNAL MEDICINE

## 2024-10-19 PROCEDURE — 1100000001 HC PRIVATE ROOM DAILY

## 2024-10-19 PROCEDURE — 85025 COMPLETE CBC W/AUTO DIFF WBC: CPT | Performed by: EMERGENCY MEDICINE

## 2024-10-19 PROCEDURE — 72131 CT LUMBAR SPINE W/O DYE: CPT | Mod: RCN

## 2024-10-19 PROCEDURE — 83921 ORGANIC ACID SINGLE QUANT: CPT

## 2024-10-19 PROCEDURE — 85045 AUTOMATED RETICULOCYTE COUNT: CPT

## 2024-10-19 PROCEDURE — 70450 CT HEAD/BRAIN W/O DYE: CPT

## 2024-10-19 PROCEDURE — 82607 VITAMIN B-12: CPT

## 2024-10-19 PROCEDURE — 83010 ASSAY OF HAPTOGLOBIN QUANT: CPT

## 2024-10-19 PROCEDURE — 99285 EMERGENCY DEPT VISIT HI MDM: CPT | Performed by: EMERGENCY MEDICINE

## 2024-10-19 PROCEDURE — 84443 ASSAY THYROID STIM HORMONE: CPT

## 2024-10-19 PROCEDURE — 83880 ASSAY OF NATRIURETIC PEPTIDE: CPT | Performed by: EMERGENCY MEDICINE

## 2024-10-19 PROCEDURE — 74177 CT ABD & PELVIS W/CONTRAST: CPT | Performed by: RADIOLOGY

## 2024-10-19 PROCEDURE — 72128 CT CHEST SPINE W/O DYE: CPT | Mod: RCN | Performed by: RADIOLOGY

## 2024-10-19 PROCEDURE — 93010 ELECTROCARDIOGRAM REPORT: CPT | Performed by: EMERGENCY MEDICINE

## 2024-10-19 RX ORDER — ATORVASTATIN CALCIUM 80 MG/1
80 TABLET, FILM COATED ORAL NIGHTLY
Status: DISCONTINUED | OUTPATIENT
Start: 2024-10-19 | End: 2024-10-21 | Stop reason: HOSPADM

## 2024-10-19 RX ORDER — FUROSEMIDE 20 MG/1
10 TABLET ORAL DAILY
Status: DISCONTINUED | OUTPATIENT
Start: 2024-10-20 | End: 2024-10-21 | Stop reason: HOSPADM

## 2024-10-19 RX ORDER — CYCLOBENZAPRINE HCL 10 MG
10 TABLET ORAL NIGHTLY PRN
Status: DISCONTINUED | OUTPATIENT
Start: 2024-10-19 | End: 2024-10-19

## 2024-10-19 RX ORDER — DICLOFENAC SODIUM 10 MG/G
4 GEL TOPICAL 4 TIMES DAILY PRN
Status: DISCONTINUED | OUTPATIENT
Start: 2024-10-19 | End: 2024-10-21 | Stop reason: HOSPADM

## 2024-10-19 RX ORDER — AMIODARONE HYDROCHLORIDE 200 MG/1
200 TABLET ORAL DAILY
Status: DISCONTINUED | OUTPATIENT
Start: 2024-10-20 | End: 2024-10-21 | Stop reason: HOSPADM

## 2024-10-19 RX ORDER — CYCLOBENZAPRINE HCL 10 MG
10 TABLET ORAL DAILY PRN
Status: DISCONTINUED | OUTPATIENT
Start: 2024-10-19 | End: 2024-10-21 | Stop reason: HOSPADM

## 2024-10-19 RX ORDER — HYDRALAZINE HYDROCHLORIDE 25 MG/1
50 TABLET, FILM COATED ORAL 3 TIMES DAILY
Status: DISCONTINUED | OUTPATIENT
Start: 2024-10-19 | End: 2024-10-21 | Stop reason: HOSPADM

## 2024-10-19 RX ORDER — POLYETHYLENE GLYCOL 3350 17 G/17G
17 POWDER, FOR SOLUTION ORAL DAILY
Status: DISCONTINUED | OUTPATIENT
Start: 2024-10-19 | End: 2024-10-21 | Stop reason: HOSPADM

## 2024-10-19 RX ORDER — ACETAMINOPHEN 325 MG/1
975 TABLET ORAL ONCE
Status: DISCONTINUED | OUTPATIENT
Start: 2024-10-19 | End: 2024-10-19

## 2024-10-19 RX ORDER — LOSARTAN POTASSIUM 100 MG/1
100 TABLET ORAL DAILY
Status: DISCONTINUED | OUTPATIENT
Start: 2024-10-19 | End: 2024-10-21 | Stop reason: HOSPADM

## 2024-10-19 RX ORDER — FUROSEMIDE 40 MG/1
20 TABLET ORAL DAILY
Status: DISCONTINUED | OUTPATIENT
Start: 2024-10-19 | End: 2024-10-19

## 2024-10-19 RX ORDER — ACETAMINOPHEN 325 MG/1
975 TABLET ORAL 3 TIMES DAILY
Status: DISCONTINUED | OUTPATIENT
Start: 2024-10-19 | End: 2024-10-21 | Stop reason: HOSPADM

## 2024-10-19 RX ORDER — FENTANYL CITRATE 50 UG/ML
50 INJECTION, SOLUTION INTRAMUSCULAR; INTRAVENOUS ONCE
Status: COMPLETED | OUTPATIENT
Start: 2024-10-19 | End: 2024-10-19

## 2024-10-19 RX ORDER — SERTRALINE HYDROCHLORIDE 50 MG/1
50 TABLET, FILM COATED ORAL DAILY
Status: DISCONTINUED | OUTPATIENT
Start: 2024-10-19 | End: 2024-10-21 | Stop reason: HOSPADM

## 2024-10-19 RX ORDER — MIRTAZAPINE 15 MG/1
7.5 TABLET, FILM COATED ORAL NIGHTLY
Status: DISCONTINUED | OUTPATIENT
Start: 2024-10-19 | End: 2024-10-21 | Stop reason: HOSPADM

## 2024-10-19 RX ORDER — IPRATROPIUM BROMIDE 21 UG/1
2 SPRAY, METERED NASAL EVERY 12 HOURS
Status: DISCONTINUED | OUTPATIENT
Start: 2024-10-19 | End: 2024-10-21 | Stop reason: HOSPADM

## 2024-10-19 RX ORDER — METHOCARBAMOL 500 MG/1
500 TABLET, FILM COATED ORAL EVERY 8 HOURS PRN
Status: DISCONTINUED | OUTPATIENT
Start: 2024-10-19 | End: 2024-10-21 | Stop reason: HOSPADM

## 2024-10-19 RX ORDER — VARENICLINE TARTRATE 1 MG/1
1 TABLET, FILM COATED ORAL 2 TIMES DAILY
Status: DISCONTINUED | OUTPATIENT
Start: 2024-10-20 | End: 2024-10-20

## 2024-10-19 RX ADMIN — MIRTAZAPINE 7.5 MG: 15 TABLET, FILM COATED ORAL at 21:56

## 2024-10-19 RX ADMIN — HYDRALAZINE HYDROCHLORIDE 50 MG: 25 TABLET ORAL at 21:56

## 2024-10-19 RX ADMIN — FENTANYL CITRATE 50 MCG: 50 INJECTION INTRAMUSCULAR; INTRAVENOUS at 09:57

## 2024-10-19 RX ADMIN — ACETAMINOPHEN 975 MG: 325 TABLET ORAL at 14:07

## 2024-10-19 RX ADMIN — IOHEXOL 100 ML: 350 INJECTION, SOLUTION INTRAVENOUS at 11:03

## 2024-10-19 RX ADMIN — ATORVASTATIN CALCIUM 80 MG: 80 TABLET, FILM COATED ORAL at 21:56

## 2024-10-19 RX ADMIN — DICLOFENAC SODIUM 4 G: 10 GEL TOPICAL at 13:49

## 2024-10-19 RX ADMIN — LOSARTAN POTASSIUM 100 MG: 50 TABLET, FILM COATED ORAL at 13:49

## 2024-10-19 RX ADMIN — IRON SUCROSE 200 MG: 20 INJECTION, SOLUTION INTRAVENOUS at 21:56

## 2024-10-19 RX ADMIN — HYDRALAZINE HYDROCHLORIDE 50 MG: 25 TABLET ORAL at 14:07

## 2024-10-19 RX ADMIN — ACETAMINOPHEN 975 MG: 325 TABLET ORAL at 21:56

## 2024-10-19 RX ADMIN — SERTRALINE 50 MG: 50 TABLET, FILM COATED ORAL at 13:49

## 2024-10-19 RX ADMIN — APIXABAN 5 MG: 5 TABLET, FILM COATED ORAL at 21:56

## 2024-10-19 SDOH — HEALTH STABILITY: MENTAL HEALTH: HOW OFTEN DO YOU HAVE A DRINK CONTAINING ALCOHOL?: NEVER

## 2024-10-19 SDOH — SOCIAL STABILITY: SOCIAL INSECURITY: HAVE YOU HAD THOUGHTS OF HARMING ANYONE ELSE?: NO

## 2024-10-19 SDOH — ECONOMIC STABILITY: FOOD INSECURITY: WITHIN THE PAST 12 MONTHS, YOU WORRIED THAT YOUR FOOD WOULD RUN OUT BEFORE YOU GOT THE MONEY TO BUY MORE.: NEVER TRUE

## 2024-10-19 SDOH — SOCIAL STABILITY: SOCIAL INSECURITY
WITHIN THE LAST YEAR, HAVE YOU BEEN KICKED, HIT, SLAPPED, OR OTHERWISE PHYSICALLY HURT BY YOUR PARTNER OR EX-PARTNER?: NO

## 2024-10-19 SDOH — SOCIAL STABILITY: SOCIAL INSECURITY: WERE YOU ABLE TO COMPLETE ALL THE BEHAVIORAL HEALTH SCREENINGS?: YES

## 2024-10-19 SDOH — ECONOMIC STABILITY: TRANSPORTATION INSECURITY: IN THE PAST 12 MONTHS, HAS LACK OF TRANSPORTATION KEPT YOU FROM MEDICAL APPOINTMENTS OR FROM GETTING MEDICATIONS?: NO

## 2024-10-19 SDOH — HEALTH STABILITY: MENTAL HEALTH: HOW MANY DRINKS CONTAINING ALCOHOL DO YOU HAVE ON A TYPICAL DAY WHEN YOU ARE DRINKING?: PATIENT DOES NOT DRINK

## 2024-10-19 SDOH — HEALTH STABILITY: MENTAL HEALTH: HOW OFTEN DO YOU HAVE SIX OR MORE DRINKS ON ONE OCCASION?: NEVER

## 2024-10-19 SDOH — SOCIAL STABILITY: SOCIAL INSECURITY: WITHIN THE LAST YEAR, HAVE YOU BEEN AFRAID OF YOUR PARTNER OR EX-PARTNER?: NO

## 2024-10-19 SDOH — ECONOMIC STABILITY: FOOD INSECURITY: WITHIN THE PAST 12 MONTHS, THE FOOD YOU BOUGHT JUST DIDN'T LAST AND YOU DIDN'T HAVE MONEY TO GET MORE.: NEVER TRUE

## 2024-10-19 SDOH — ECONOMIC STABILITY: FOOD INSECURITY: HOW HARD IS IT FOR YOU TO PAY FOR THE VERY BASICS LIKE FOOD, HOUSING, MEDICAL CARE, AND HEATING?: NOT HARD AT ALL

## 2024-10-19 SDOH — SOCIAL STABILITY: SOCIAL INSECURITY: HAS ANYONE EVER THREATENED TO HURT YOUR FAMILY OR YOUR PETS?: NO

## 2024-10-19 SDOH — ECONOMIC STABILITY: HOUSING INSECURITY: AT ANY TIME IN THE PAST 12 MONTHS, WERE YOU HOMELESS OR LIVING IN A SHELTER (INCLUDING NOW)?: NO

## 2024-10-19 SDOH — SOCIAL STABILITY: SOCIAL INSECURITY
WITHIN THE LAST YEAR, HAVE YOU BEEN RAPED OR FORCED TO HAVE ANY KIND OF SEXUAL ACTIVITY BY YOUR PARTNER OR EX-PARTNER?: NO

## 2024-10-19 SDOH — SOCIAL STABILITY: SOCIAL INSECURITY: ARE THERE ANY APPARENT SIGNS OF INJURIES/BEHAVIORS THAT COULD BE RELATED TO ABUSE/NEGLECT?: NO

## 2024-10-19 SDOH — ECONOMIC STABILITY: INCOME INSECURITY: IN THE PAST 12 MONTHS HAS THE ELECTRIC, GAS, OIL, OR WATER COMPANY THREATENED TO SHUT OFF SERVICES IN YOUR HOME?: NO

## 2024-10-19 SDOH — ECONOMIC STABILITY: HOUSING INSECURITY: IN THE LAST 12 MONTHS, WAS THERE A TIME WHEN YOU WERE NOT ABLE TO PAY THE MORTGAGE OR RENT ON TIME?: NO

## 2024-10-19 SDOH — SOCIAL STABILITY: SOCIAL INSECURITY: ARE YOU OR HAVE YOU BEEN THREATENED OR ABUSED PHYSICALLY, EMOTIONALLY, OR SEXUALLY BY ANYONE?: NO

## 2024-10-19 SDOH — SOCIAL STABILITY: SOCIAL INSECURITY: WITHIN THE LAST YEAR, HAVE YOU BEEN HUMILIATED OR EMOTIONALLY ABUSED IN OTHER WAYS BY YOUR PARTNER OR EX-PARTNER?: NO

## 2024-10-19 SDOH — SOCIAL STABILITY: SOCIAL INSECURITY: DO YOU FEEL UNSAFE GOING BACK TO THE PLACE WHERE YOU ARE LIVING?: NO

## 2024-10-19 SDOH — ECONOMIC STABILITY: HOUSING INSECURITY: IN THE PAST 12 MONTHS, HOW MANY TIMES HAVE YOU MOVED WHERE YOU WERE LIVING?: 0

## 2024-10-19 SDOH — SOCIAL STABILITY: SOCIAL INSECURITY: DO YOU FEEL ANYONE HAS EXPLOITED OR TAKEN ADVANTAGE OF YOU FINANCIALLY OR OF YOUR PERSONAL PROPERTY?: NO

## 2024-10-19 SDOH — SOCIAL STABILITY: SOCIAL INSECURITY: ABUSE: ADULT

## 2024-10-19 SDOH — SOCIAL STABILITY: SOCIAL INSECURITY: DOES ANYONE TRY TO KEEP YOU FROM HAVING/CONTACTING OTHER FRIENDS OR DOING THINGS OUTSIDE YOUR HOME?: NO

## 2024-10-19 SDOH — SOCIAL STABILITY: SOCIAL INSECURITY: HAVE YOU HAD ANY THOUGHTS OF HARMING ANYONE ELSE?: NO

## 2024-10-19 ASSESSMENT — ENCOUNTER SYMPTOMS
SHORTNESS OF BREATH: 0
NAUSEA: 0
ABDOMINAL PAIN: 0
CHILLS: 0
BACK PAIN: 1
HEADACHES: 0
PALPITATIONS: 0
COUGH: 0
CONSTIPATION: 0
VOMITING: 0
LIGHT-HEADEDNESS: 0
FEVER: 0
DIARRHEA: 0
DYSURIA: 0
DIZZINESS: 0

## 2024-10-19 ASSESSMENT — LIFESTYLE VARIABLES
SKIP TO QUESTIONS 9-10: 1
EVER FELT BAD OR GUILTY ABOUT YOUR DRINKING: NO
HOW OFTEN DO YOU HAVE A DRINK CONTAINING ALCOHOL: NEVER
SKIP TO QUESTIONS 9-10: 1
HAVE YOU EVER FELT YOU SHOULD CUT DOWN ON YOUR DRINKING: NO
EVER HAD A DRINK FIRST THING IN THE MORNING TO STEADY YOUR NERVES TO GET RID OF A HANGOVER: NO
TOTAL SCORE: 0
HAVE PEOPLE ANNOYED YOU BY CRITICIZING YOUR DRINKING: NO
HOW OFTEN DO YOU HAVE 6 OR MORE DRINKS ON ONE OCCASION: NEVER
AUDIT-C TOTAL SCORE: 0
AUDIT-C TOTAL SCORE: 0
HOW MANY STANDARD DRINKS CONTAINING ALCOHOL DO YOU HAVE ON A TYPICAL DAY: PATIENT DOES NOT DRINK
AUDIT-C TOTAL SCORE: 0

## 2024-10-19 ASSESSMENT — PAIN DESCRIPTION - PAIN TYPE: TYPE: ACUTE PAIN

## 2024-10-19 ASSESSMENT — PAIN SCALES - GENERAL
PAINLEVEL_OUTOF10: 6
PAINLEVEL_OUTOF10: 0 - NO PAIN
PAINLEVEL_OUTOF10: 6
PAINLEVEL_OUTOF10: 3
PAINLEVEL_OUTOF10: 3

## 2024-10-19 ASSESSMENT — PAIN SCALES - PAIN ASSESSMENT IN ADVANCED DEMENTIA (PAINAD)
TOTALSCORE: MEDICATION (SEE MAR)
TOTALSCORE: MEDICATION (SEE MAR)

## 2024-10-19 ASSESSMENT — PATIENT HEALTH QUESTIONNAIRE - PHQ9
SUM OF ALL RESPONSES TO PHQ9 QUESTIONS 1 & 2: 0
2. FEELING DOWN, DEPRESSED OR HOPELESS: NOT AT ALL
1. LITTLE INTEREST OR PLEASURE IN DOING THINGS: NOT AT ALL

## 2024-10-19 ASSESSMENT — COGNITIVE AND FUNCTIONAL STATUS - GENERAL
MOVING TO AND FROM BED TO CHAIR: A LITTLE
PATIENT BASELINE BEDBOUND: NO
TOILETING: A LOT
EATING MEALS: A LITTLE
PERSONAL GROOMING: A LITTLE
TOILETING: A LOT
PERSONAL GROOMING: A LITTLE
DAILY ACTIVITIY SCORE: 17
TURNING FROM BACK TO SIDE WHILE IN FLAT BAD: A LITTLE
STANDING UP FROM CHAIR USING ARMS: A LITTLE
MOBILITY SCORE: 16
HELP NEEDED FOR BATHING: A LITTLE
DRESSING REGULAR LOWER BODY CLOTHING: A LITTLE
CLIMB 3 TO 5 STEPS WITH RAILING: TOTAL
MOVING FROM LYING ON BACK TO SITTING ON SIDE OF FLAT BED WITH BEDRAILS: A LITTLE
WALKING IN HOSPITAL ROOM: A LITTLE
DRESSING REGULAR UPPER BODY CLOTHING: A LITTLE
DRESSING REGULAR UPPER BODY CLOTHING: A LITTLE
HELP NEEDED FOR BATHING: A LITTLE
WALKING IN HOSPITAL ROOM: A LITTLE
DRESSING REGULAR LOWER BODY CLOTHING: A LITTLE
DAILY ACTIVITIY SCORE: 17
CLIMB 3 TO 5 STEPS WITH RAILING: TOTAL
EATING MEALS: A LITTLE
STANDING UP FROM CHAIR USING ARMS: A LITTLE
MOVING FROM LYING ON BACK TO SITTING ON SIDE OF FLAT BED WITH BEDRAILS: A LITTLE
MOVING TO AND FROM BED TO CHAIR: A LITTLE
MOBILITY SCORE: 16
TURNING FROM BACK TO SIDE WHILE IN FLAT BAD: A LITTLE

## 2024-10-19 ASSESSMENT — ACTIVITIES OF DAILY LIVING (ADL)
DRESSING YOURSELF: NEEDS ASSISTANCE
ASSISTIVE_DEVICE: OTHER (COMMENT);EYEGLASSES
HEARING - LEFT EAR: FUNCTIONAL
HEARING - RIGHT EAR: FUNCTIONAL
PATIENT'S MEMORY ADEQUATE TO SAFELY COMPLETE DAILY ACTIVITIES?: NO
ADEQUATE_TO_COMPLETE_ADL: YES
TOILETING: NEEDS ASSISTANCE
BATHING: NEEDS ASSISTANCE
WALKS IN HOME: NEEDS ASSISTANCE
JUDGMENT_ADEQUATE_SAFELY_COMPLETE_DAILY_ACTIVITIES: NO
FEEDING YOURSELF: INDEPENDENT
GROOMING: NEEDS ASSISTANCE
LACK_OF_TRANSPORTATION: NO

## 2024-10-19 ASSESSMENT — PAIN DESCRIPTION - LOCATION
LOCATION: BACK
LOCATION: NECK
LOCATION: BACK

## 2024-10-19 ASSESSMENT — COLUMBIA-SUICIDE SEVERITY RATING SCALE - C-SSRS
2. HAVE YOU ACTUALLY HAD ANY THOUGHTS OF KILLING YOURSELF?: NO
1. IN THE PAST MONTH, HAVE YOU WISHED YOU WERE DEAD OR WISHED YOU COULD GO TO SLEEP AND NOT WAKE UP?: NO
6. HAVE YOU EVER DONE ANYTHING, STARTED TO DO ANYTHING, OR PREPARED TO DO ANYTHING TO END YOUR LIFE?: NO

## 2024-10-19 ASSESSMENT — PAIN SCALES - WONG BAKER: WONGBAKER_NUMERICALRESPONSE: HURTS LITTLE BIT

## 2024-10-19 ASSESSMENT — PAIN - FUNCTIONAL ASSESSMENT: PAIN_FUNCTIONAL_ASSESSMENT: 0-10

## 2024-10-19 NOTE — ED PROVIDER NOTES
History of Present Illness     History provided by: Patient and Family Member  Limitations to History: Confusion  External Records Reviewed with Brief Summary: None    HPI:  Casie Obrien is a 74 y.o. female past medical history of stroke hypertension hyperlipidemia dementia ANO x 3 at baseline as well as A-fib on anticoagulation who presents today for a fall.  Patient apparently fell out of bed, is unable to recount details.  She does endorse that she did fall backwards hitting her head, which she knows because the back of her head hurts.  She denies any numbness weakness or tingling, no changes in her vision.  She denies any headache.  She denies any chest pain or shortness of breath, no abdominal pain nausea vomiting diarrhea or back pain.  She states she is unsure why this happened, was not feeling dizzy beforehand, is not currently feeling dizzy.  She is alert and oriented to self place and time    Physical Exam   Triage vitals:  T 36.6 °C (97.9 °F)  HR 63  /79  RR 17  O2 98 % None (Room air)    Physical Exam  Vitals and nursing note reviewed.   Constitutional:       General: She is not in acute distress.     Appearance: Normal appearance. She is not ill-appearing or diaphoretic.   HENT:      Head: Normocephalic and atraumatic.      Mouth/Throat:      Mouth: Mucous membranes are moist.      Pharynx: No oropharyngeal exudate or posterior oropharyngeal erythema.   Eyes:      General: No scleral icterus.     Extraocular Movements: Extraocular movements intact.      Pupils: Pupils are equal, round, and reactive to light.   Neck:      Comments: In c-collar, no midline C-spine tenderness  Cardiovascular:      Rate and Rhythm: Normal rate and regular rhythm.      Pulses: Normal pulses.      Heart sounds: Normal heart sounds. No murmur heard.     No gallop.   Pulmonary:      Effort: Pulmonary effort is normal. No respiratory distress.      Breath sounds: Normal breath sounds. No stridor. No wheezing, rhonchi  or rales.   Abdominal:      General: Bowel sounds are normal. There is no distension.      Palpations: Abdomen is soft. There is no mass.      Tenderness: There is no abdominal tenderness.      Hernia: No hernia is present.   Musculoskeletal:         General: No swelling, deformity or signs of injury. Normal range of motion.      Cervical back: Normal range of motion and neck supple. No tenderness.      Comments: No T or L-spine tenderness, no step-offs no deformities, no tenderness to palpation of bilateral upper or lower extremities, full active range of motion.   Skin:     General: Skin is warm.      Capillary Refill: Capillary refill takes less than 2 seconds.      Findings: No erythema, lesion or rash.   Neurological:      General: No focal deficit present.      Mental Status: She is alert and oriented to person, place, and time. Mental status is at baseline.   Psychiatric:         Mood and Affect: Mood normal.         Behavior: Behavior normal.          Medical Decision Making & ED Course   Medical Decision Makin y.o. female past medical history of previous stroke hypertension hyperlipidemia A-fib on anticoagulation who presents today for fall.  Patient is well-appearing on exam, does not have any large hematoma to the back of her head.  However, given the fact that she would fall, is elderly, does take blood thinners, we will get labs as well as scans to assess for possible syncopal causes. We will also confirm the patient does not have any intracranial hemorrhage.  On initial evaluation of labs, patient's hemoglobin is 8.4, was most recently 12.4, however, this was in .  There is no clear evidence as to what this drop in hemoglobin was from.  Patient's labs do also demonstrate a mildly elevated BNP suggestive of CHF as well as a mild elevation of troponin without any EKG changes.  Given this, we did decide to get a chest abdomen pelvis given the patient's fall and anticoagulation use.  Patient's  imaging was wholly negative, which is reassuring.  However, we are unsure what the reason for the patient's fall is, and with a new hemoglobin drop, we will admit her to the hospital for serial hemoglobins and syncopal workup.  ----      Differential diagnoses considered include but are not limited to: Cardiogenic syncope, dysrhythmia, anemia, electrolyte abnormality.     Social Determinants of Health which Significantly Impact Care: None identified     EKG Independent Interpretation: EKG interpreted by myself. Please see ED Course for full interpretation.    Independent Result Review and Interpretation: Relevant laboratory and radiographic results were reviewed and independently interpreted by myself.  As necessary, they are commented on in the ED Course.    Chronic conditions affecting the patient's care: As documented above in [facility]    The patient was discussed with the following consultants/services: Admission Coordinator who accepted the patient for admission    Care Considerations: As documented above in [facility]    ED Course:  ED Course as of 10/20/24 1157   Sat Oct 19, 2024   0907 EKG NSR HR 65 qtc 468 no LICO or STD, no  [AM]      ED Course User Index  [AM] Flaquito Nino MD         Diagnoses as of 10/20/24 1157   Fall, initial encounter   Closed head injury, initial encounter   Anticoagulated   Anemia, unspecified type   HFrEF (heart failure with reduced ejection fraction)   Nonrheumatic aortic valve insufficiency     Disposition   As a result of their workup, the patient will require admission to the hospital.  The patient was informed of her diagnosis.  The patient was given the opportunity to ask questions and I answered them. The patient agreed to be admitted to the hospital.    Procedures   Procedures    Patient seen and discussed with ED attending physician.    Korey Newton MD  Emergency Medicine       Korey Newton MD  Resident  10/20/24 0638

## 2024-10-19 NOTE — NURSING NOTE
75 yo female received via cart from ER at 1520. Able to walk from cart in lala to bed with 2 assist. Has pure wick in place. Oriented to room environment and instructed to call for assist. Bed alarm activated.

## 2024-10-19 NOTE — PROGRESS NOTES
Pharmacy Medication History Review    Casie Obrien is a 74 y.o. female admitted for Fall, initial encounter. Pharmacy reviewed the patient's [id number] medications and allergies for accuracy.    Medications ADDED:  none  Medications CHANGED:  Acetaminophen changed to 2 tabs by mouth every 12 Hrs PRN  Atrovent changed from 8 hours PRN to every 12 hours PRN  Losartan 50mg to Losartan 100mg  Furosemide changed to 10mg daily  Medications REMOVED:   Lidocaine patch  Diclofenac gel    The list below reflects the updated PTA list.   Prior to Admission Medications   Prescriptions Last Dose Informant   acetaminophen (Tylenol 8 HOUR) 650 mg ER tablet  Other   Sig: Take 2 tablets (1,300 mg) by mouth every 12 hours if needed for mild pain (1 - 3). Do not crush, chew, or split.   amiodarone (Pacerone) 200 mg tablet Morning Other   Sig: Take by mouth once daily.   apixaban (Eliquis) 5 mg (74 tabs) tablet  Other   Sig: Take 1 tablet (5 mg) by mouth 2 times a day.   atorvastatin (Lipitor) 80 mg tablet Bedtime Other   Sig: Take 1 tablet (80 mg) by mouth once daily.   cyclobenzaprine (Flexeril) 10 mg tablet Not Taking    Sig: Take 1 tablet (10 mg) by mouth as needed at bedtime for muscle spasms for up to 20 days.   Patient not taking: Reported on 10/19/2024   furosemide (Lasix) 20 mg tablet Morning Other   Sig: Take 0.5 tablets (10 mg) by mouth once daily.   hydrALAZINE (Apresoline) 50 mg tablet  Other   Sig: Take 1 tablet (50 mg) by mouth 3 times a day.   ipratropium (Atrovent) 21 mcg (0.03 %) nasal spray  Other   Sig: Administer 2 sprays into each nostril every 12 hours if needed (allergy sx).   loratadine (Claritin) 10 mg tablet Morning Other   Sig: Take 1 tablet (10 mg) by mouth once daily.   losartan (Cozaar) 100 mg tablet Morning Other   Sig: Take 1 tablet (100 mg) by mouth once daily.   methocarbamol (Robaxin) 500 mg tablet  Other   Sig: Take 1-2 tabs every 8 hours as needed for spasms   mirtazapine (Remeron) 7.5 mg  "tablet Evening Other   Sig: Take 1 tablet (7.5 mg) by mouth once daily at bedtime.   nicotine polacrilex (Nicorette) 4 mg gum  Other   Sig: Chew 1 each (4 mg) if needed for smoking cessation.   sertraline (Zoloft) 50 mg tablet Evening Other   Sig: Take 1 tablet (50 mg) by mouth once daily.   varenicline (Chantix) 1 mg tablet Morning Other   Sig: Take 1 tablet (1 mg) by mouth 2 times a day. Take with full glass of water.      Facility-Administered Medications: None        The list below reflects the updated allergy list. Please review each documented allergy for additional clarification and justification.  Allergies  Reviewed by Nicki Ellis RN on 10/19/2024   No Known Allergies         Patient declines M2B at discharge.     Sources:   SNF med list from [facility] with medication profile printed for 9/30/24 (copy in paper chart and scanned to Media in Epic profile for patient)    Additional Comments:  Pt from [facility]        Amee Whitlock [facility]  PGY-1 Pharmacy Resident  10/19/24     Secure Chat preferred   If no response call [id number] or [id number] \"Med Rec\"    "

## 2024-10-19 NOTE — H&P
"History Of Present Illness  Casie Obrien is a 74 y.o. female presenting with syncope and fall. She was brought in by EMS after nursing staff found her on the ground at her SNF. Apparently she was trying to get out of bed and fell. She is a poor historian given her dementia, but she does not recall any specific feels, sensations, or symptoms prior to losing consciousness. No report of chest pain, palpitations, shortness of breath, lightheadedness, dizziness, or urinary incontinence. Post-ictal state difficult to assess given her dementia and lack of collateral information.       Review of Systems   Constitutional:  Negative for chills and fever.   Respiratory:  Negative for cough and shortness of breath.    Cardiovascular:  Negative for chest pain and palpitations.   Gastrointestinal:  Negative for abdominal pain, constipation, diarrhea, nausea and vomiting.   Genitourinary:  Negative for dysuria.   Musculoskeletal:  Positive for back pain.   Neurological:  Negative for dizziness, light-headedness and headaches.       Past Medical History  Past Medical History:   Diagnosis Date    Hx antineoplastic chemo 01/01/1990 1990's RT chemo       Surgical History  Past Surgical History:   Procedure Laterality Date    BREAST LUMPECTOMY Right 01/01/1990 1990's RT lumpectomy w/ chemo        Social History  She reports that she has been smoking cigarettes. She has never used smokeless tobacco. No history on file for alcohol use and drug use.      Family History  Family History   Problem Relation Name Age of Onset    Breast cancer Sister          Allergies  Patient has no known allergies.      Last Recorded Vitals  Blood pressure 165/84, pulse 65, temperature 36.6 °C (97.9 °F), temperature source Temporal, resp. rate 14, height 1.651 m (5' 5\"), weight 63.5 kg (140 lb), SpO2 96%.     Physical Exam  Vitals reviewed.   Constitutional:       General: She is not in acute distress.     Appearance: Normal appearance. She is not " toxic-appearing.   HENT:      Head: Normocephalic and atraumatic.      Mouth/Throat:      Mouth: Mucous membranes are moist.   Cardiovascular:      Rate and Rhythm: Normal rate and regular rhythm.      Pulses: Normal pulses.      Heart sounds: Murmur heard.      Comments: Systolic ejection murmur appreciated  Pulmonary:      Effort: Pulmonary effort is normal. No respiratory distress.      Breath sounds: Normal breath sounds. No stridor. No wheezing, rhonchi or rales.   Abdominal:      General: Abdomen is flat. Bowel sounds are normal.      Palpations: Abdomen is soft.      Tenderness: There is no abdominal tenderness. There is no guarding.   Musculoskeletal:      Right lower leg: No edema.      Left lower leg: No edema.   Skin:     General: Skin is warm.      Capillary Refill: Capillary refill takes less than 2 seconds.   Neurological:      General: No focal deficit present.      Mental Status: She is alert.      Motor: No weakness.      Comments: Not oriented to time, place, or situation        Assessment/Plan   No Principal Problem: There is no principal problem currently on the Problem List. Please update the Problem List and refresh.    Casie Obrien is a 74 y.o. female with PMH of HFrecEF, CAD (RCA  in 2020, no previous PCI), HTN, HLD, AFlutter s/p DCCV on 3/16/22 (on [facility]), R breast Ca s/p mastectomy, dementia, fall resulting in L1 compression fracture, dementia who is admitted for syncope c/b fall. She has not suffered complications from fall including intracranial bleed or heck/neck fracture. In terms of etiology of syncope, unlikely ACS given EKG and trops. Unlikely PE per Wells score. CXR and BNP support mild to moderate volume overload and some pulmonary edema. This is supported by the TTE in 4/2024 showing slightly decreased but recovered EF and dilated atria. Other causes could be her anemia given it is ~4 points lower than our last known reading in 06/2022 of 12.3.     Acute problems  #  Syncope  # Iron Deficiency Anemia  - CTH noncon: No acute intracranial abnormality or calvarial fracture  - CT C/T/L spine noncon  - Cervical: No acute fracture or traumatic malalignment of the cervical spine. Spondylotic changes.  - Thoracic: No CT evidence of acute thoracic vertebral body or posterior element fracture. Osteopenia.  - Lumbar: Compression fracture of L1 without evidence of retropulsion, visualized on previous radiograph 2/2024. No evidence of new lumbar spinal fracture. Radiolucencies observed in the L3, L4, and L5 vertebral bodies, which are circumscribed with sclerotic margins and likely representative of a benign process.  - CT CAP w/ contrast: No acute abnormality in the chest. 4-chamber cardiomegaly with moderate to severe coronary artery calcifications. Chronic lung changes better evaluated on high-resolution CT chest  dated 03/22/2024. Significant calcific disease of the origin of the left subclavian artery causing moderate to severe stenosis. No acute abnormality in the abdomen or pelvis.  - CXR: Prominent interstitial and patchy airspace opacities, which could represent mild pulmonary edema. No evidence of focal consolidation or pneumothorax  - BNP: 339  - Trop: 48 --> 52  - EKG in ED: NSR w/ 1st degree AV block and LVH  - Last echo: TTE 4/2024: HFpEF, EF of 55%, LA severely dilated, mild AI, severe LVH, and inferoapical hypo- to akinesia and inferolateral hypokinesia. There is anterobasal septal hypertrophy with a sigmoid septum  - Hgb in ED: 8.4 (previously in 6/2022: 12.3)  - Well's score for PE: 0  Plan  - Obtain iron panel labs, reticulocyte count, and heme occult  - Obtain orthostatics  - Obtain limited TTE    # H/o Multiple Falls  - PT/OT consults  - Risk vs. Benefit conversation with family and patient about utilization of anticoagulant i/s/o multiple falls    Chronic problems  # HFrecEF  # HTN  - Continue home losartan and hydralazine  - Holding home lasix    # CAD (RCA  in  2020, no previous PCI)  # HLD  - Not on ASA at home  - Continue home atorvastatin    # Aflutter (on Eliquis)  # H/o TIA  - Continue home amiodarone  - Holding Eliquis given possible bleed    # COPD  - Continue home ipratropium    # Osteoarthritis  # Chronic Low Back Pain  - Pain regimen:   - Scheduled PO Tylenol 975mg TID   - PRN Voltaren gel QID PRN   - Holding home cyclobenzaprine and methocarbamol    # Insomnia  - Holding home mirtazapine    # Anxiety  # Depression  - Continue home sertraline    # Tobacco Use  - Continue home varenicline and nicotine gum    # Allergic Rhinitis  - Continue home loratadine    Prevention of hospital-associated complications:  DVT Ppx: SCDs, holding pharmacologics i/s/o possible bleed  Diet: NPO  GI PPx: N/A  Bowel Regimen: Scheduled Miralax  Borges: No  IVF: None  Access/Lines: PIVs  Telemetry: No  Physical Therapy Consult Needed: Yes  Antimicrobials: None    DISPOSITION: pending hospital course    CODE STATUS: Full Code    NOK: Linda Obrien (Daughter) [phone]       Jacob Prasad MD  PGY-1 Resident Physician  Department of Anesthesiology & Perioperative Medicine

## 2024-10-19 NOTE — CARE PLAN
The patient's goals for the shift include      The clinical goals for the shift include no falls or injury. Bed alarm maintained. No falls or injury.    .

## 2024-10-19 NOTE — ED TRIAGE NOTES
Pt from nursing home and fell out of bed and hit the back of her head. She endorses head, neck and shoulder pain. Pt has hx of stroke, HTN, and dementia.

## 2024-10-20 VITALS
WEIGHT: 144.18 LBS | HEIGHT: 65 IN | DIASTOLIC BLOOD PRESSURE: 61 MMHG | TEMPERATURE: 97.7 F | OXYGEN SATURATION: 99 % | SYSTOLIC BLOOD PRESSURE: 104 MMHG | HEART RATE: 61 BPM | RESPIRATION RATE: 18 BRPM | BODY MASS INDEX: 24.02 KG/M2

## 2024-10-20 PROBLEM — D50.9 IRON DEFICIENCY ANEMIA: Status: ACTIVE | Noted: 2024-10-20

## 2024-10-20 LAB
ALBUMIN SERPL BCP-MCNC: 3.7 G/DL (ref 3.4–5)
ANION GAP SERPL CALC-SCNC: 11 MMOL/L (ref 10–20)
ATRIAL RATE: 65 BPM
BUN SERPL-MCNC: 14 MG/DL (ref 6–23)
CALCIUM SERPL-MCNC: 9.4 MG/DL (ref 8.6–10.6)
CHLORIDE SERPL-SCNC: 103 MMOL/L (ref 98–107)
CO2 SERPL-SCNC: 26 MMOL/L (ref 21–32)
CREAT SERPL-MCNC: 1.19 MG/DL (ref 0.5–1.05)
EGFRCR SERPLBLD CKD-EPI 2021: 47 ML/MIN/1.73M*2
ERYTHROCYTE [DISTWIDTH] IN BLOOD BY AUTOMATED COUNT: 18.6 % (ref 11.5–14.5)
GLUCOSE SERPL-MCNC: 103 MG/DL (ref 74–99)
HCT VFR BLD AUTO: 26.4 % (ref 36–46)
HGB BLD-MCNC: 8.2 G/DL (ref 12–16)
MAGNESIUM SERPL-MCNC: 1.79 MG/DL (ref 1.6–2.4)
MCH RBC QN AUTO: 27.2 PG (ref 26–34)
MCHC RBC AUTO-ENTMCNC: 31.1 G/DL (ref 32–36)
MCV RBC AUTO: 87 FL (ref 80–100)
NRBC BLD-RTO: 0 /100 WBCS (ref 0–0)
P AXIS: 76 DEGREES
P OFFSET: 66 MS
P ONSET: 11 MS
PHOSPHATE SERPL-MCNC: 3.2 MG/DL (ref 2.5–4.9)
PLATELET # BLD AUTO: 262 X10*3/UL (ref 150–450)
POTASSIUM SERPL-SCNC: 3.6 MMOL/L (ref 3.5–5.3)
PR INTERVAL: 408 MS
Q ONSET: 215 MS
QRS COUNT: 11 BEATS
QRS DURATION: 108 MS
QT INTERVAL: 450 MS
QTC CALCULATION(BAZETT): 468 MS
QTC FREDERICIA: 462 MS
R AXIS: 61 DEGREES
RBC # BLD AUTO: 3.02 X10*6/UL (ref 4–5.2)
SODIUM SERPL-SCNC: 136 MMOL/L (ref 136–145)
T AXIS: -7 DEGREES
T OFFSET: 440 MS
VENTRICULAR RATE: 65 BPM
WBC # BLD AUTO: 3.9 X10*3/UL (ref 4.4–11.3)

## 2024-10-20 PROCEDURE — 2500000004 HC RX 250 GENERAL PHARMACY W/ HCPCS (ALT 636 FOR OP/ED)

## 2024-10-20 PROCEDURE — 2500000001 HC RX 250 WO HCPCS SELF ADMINISTERED DRUGS (ALT 637 FOR MEDICARE OP)

## 2024-10-20 PROCEDURE — G0378 HOSPITAL OBSERVATION PER HR: HCPCS

## 2024-10-20 PROCEDURE — 85027 COMPLETE CBC AUTOMATED: CPT

## 2024-10-20 PROCEDURE — 83735 ASSAY OF MAGNESIUM: CPT

## 2024-10-20 PROCEDURE — 2500000005 HC RX 250 GENERAL PHARMACY W/O HCPCS

## 2024-10-20 PROCEDURE — 1100000001 HC PRIVATE ROOM DAILY

## 2024-10-20 PROCEDURE — 80069 RENAL FUNCTION PANEL: CPT

## 2024-10-20 PROCEDURE — 97161 PT EVAL LOW COMPLEX 20 MIN: CPT | Mod: GP

## 2024-10-20 PROCEDURE — 36415 COLL VENOUS BLD VENIPUNCTURE: CPT

## 2024-10-20 PROCEDURE — 2500000002 HC RX 250 W HCPCS SELF ADMINISTERED DRUGS (ALT 637 FOR MEDICARE OP, ALT 636 FOR OP/ED)

## 2024-10-20 PROCEDURE — 99232 SBSQ HOSP IP/OBS MODERATE 35: CPT | Performed by: INTERNAL MEDICINE

## 2024-10-20 RX ORDER — FERROUS GLUCONATE 325 MG
38 TABLET ORAL EVERY OTHER DAY
Start: 2024-10-20

## 2024-10-20 RX ORDER — QUETIAPINE FUMARATE 25 MG/1
25 TABLET, FILM COATED ORAL ONCE
Status: COMPLETED | OUTPATIENT
Start: 2024-10-21 | End: 2024-10-21

## 2024-10-20 RX ADMIN — IPRATROPIUM BROMIDE 2 SPRAY: 21 SPRAY, METERED NASAL at 12:57

## 2024-10-20 RX ADMIN — DICLOFENAC SODIUM 4 G: 10 GEL TOPICAL at 12:09

## 2024-10-20 RX ADMIN — AMIODARONE HYDROCHLORIDE 200 MG: 200 TABLET ORAL at 09:59

## 2024-10-20 RX ADMIN — ACETAMINOPHEN 975 MG: 325 TABLET ORAL at 21:56

## 2024-10-20 RX ADMIN — ACETAMINOPHEN 975 MG: 325 TABLET ORAL at 16:01

## 2024-10-20 RX ADMIN — MIRTAZAPINE 7.5 MG: 15 TABLET, FILM COATED ORAL at 21:56

## 2024-10-20 RX ADMIN — SERTRALINE 50 MG: 50 TABLET, FILM COATED ORAL at 09:59

## 2024-10-20 RX ADMIN — FUROSEMIDE 10 MG: 20 TABLET ORAL at 09:59

## 2024-10-20 RX ADMIN — POLYETHYLENE GLYCOL 3350 17 G: 17 POWDER, FOR SOLUTION ORAL at 09:59

## 2024-10-20 RX ADMIN — ATORVASTATIN CALCIUM 80 MG: 80 TABLET, FILM COATED ORAL at 21:56

## 2024-10-20 RX ADMIN — ACETAMINOPHEN 975 MG: 325 TABLET ORAL at 09:59

## 2024-10-20 RX ADMIN — APIXABAN 5 MG: 5 TABLET, FILM COATED ORAL at 10:00

## 2024-10-20 RX ADMIN — APIXABAN 5 MG: 5 TABLET, FILM COATED ORAL at 21:56

## 2024-10-20 ASSESSMENT — COGNITIVE AND FUNCTIONAL STATUS - GENERAL
HELP NEEDED FOR BATHING: A LITTLE
MOVING TO AND FROM BED TO CHAIR: A LITTLE
WALKING IN HOSPITAL ROOM: A LITTLE
TOILETING: A LITTLE
TURNING FROM BACK TO SIDE WHILE IN FLAT BAD: A LITTLE
CLIMB 3 TO 5 STEPS WITH RAILING: A LOT
DRESSING REGULAR LOWER BODY CLOTHING: A LITTLE
MOBILITY SCORE: 19
STANDING UP FROM CHAIR USING ARMS: A LITTLE
DRESSING REGULAR UPPER BODY CLOTHING: A LITTLE
WALKING IN HOSPITAL ROOM: A LITTLE
PERSONAL GROOMING: A LITTLE
PERSONAL GROOMING: A LITTLE
MOVING FROM LYING ON BACK TO SITTING ON SIDE OF FLAT BED WITH BEDRAILS: A LITTLE
CLIMB 3 TO 5 STEPS WITH RAILING: A LOT
STANDING UP FROM CHAIR USING ARMS: A LITTLE
MOBILITY SCORE: 19
CLIMB 3 TO 5 STEPS WITH RAILING: TOTAL
MOVING TO AND FROM BED TO CHAIR: A LITTLE
TOILETING: A LITTLE
DAILY ACTIVITIY SCORE: 19
DRESSING REGULAR LOWER BODY CLOTHING: A LITTLE
HELP NEEDED FOR BATHING: A LITTLE
MOVING TO AND FROM BED TO CHAIR: A LITTLE
DRESSING REGULAR UPPER BODY CLOTHING: A LITTLE
MOBILITY SCORE: 16
STANDING UP FROM CHAIR USING ARMS: A LITTLE
WALKING IN HOSPITAL ROOM: A LITTLE
DAILY ACTIVITIY SCORE: 19

## 2024-10-20 ASSESSMENT — PAIN - FUNCTIONAL ASSESSMENT: PAIN_FUNCTIONAL_ASSESSMENT: 0-10

## 2024-10-20 ASSESSMENT — ACTIVITIES OF DAILY LIVING (ADL): LACK_OF_TRANSPORTATION: NO

## 2024-10-20 ASSESSMENT — PAIN SCALES - GENERAL
PAINLEVEL_OUTOF10: 0 - NO PAIN
PAINLEVEL_OUTOF10: 0 - NO PAIN

## 2024-10-20 NOTE — CARE PLAN
The clinical goals for the shift include Patient will remain free from falls, by the end of this shift.    Patient remained free from injuries during this shift.     Patient is in bed, HOB elevated, call light within reach, bed alarm active and audible.      Plan of care ongoing.

## 2024-10-20 NOTE — DISCHARGE INSTRUCTIONS
Dear Casie Obrien,    You were admitted to [facility] from 10/19 to 10/21 for syncope and fall. We performed imaging of your head and spine and found no fractures as a result of your fall. We ruled out serious conditions like heart attack, blood clot in your lungs, heart failure exacerbation, and seizure. Your hemoglobin (a marker for how much red blood cells you have) was low, which we found was due to iron deficiency. Thus, we are starting you on iron tablets at discharge. We suspect your fall was mechanical in nature and not due to a serious condition.    Please see your After Visit Summary for detailed instructions on how you should take your medications as well as a list of your future appointment(s).    Thank you for allowing us to take part in your care and we wish you the best in your recovery.    - Your  Care Team

## 2024-10-20 NOTE — PROGRESS NOTES
10/20/24 1752   Discharge Planning   Living Arrangements Children   Support Systems Children   Type of Residence Assisted living   Do you have animals or pets at home? No   Care Facility Name [facility]   Who is requesting discharge planning? Provider   Home or Post Acute Services Other (Comment)  (assisted living)   Expected Discharge Disposition Home   Does the patient need discharge transport arranged? Yes   RoundTrip coordination needed? Yes   Has discharge transport been arranged? Yes   Financial Resource Strain   How hard is it for you to pay for the very basics like food, housing, medical care, and heating? Not very   Housing Stability   In the last 12 months, was there a time when you were not able to pay the mortgage or rent on time? N   At any time in the past 12 months, were you homeless or living in a shelter (including now)? N   Transportation Needs   In the past 12 months, has lack of transportation kept you from medical appointments or from getting medications? no   In the past 12 months, has lack of transportation kept you from meetings, work, or from getting things needed for daily living? No   Patient Choice   Provider Choice list and CMS website (https://medicare.gov/care-compare#search) for post-acute Quality and Resource Measure Data were provided and reviewed with: Patient;Family   Patient / Family choosing to utilize agency / facility established prior to hospitalization Yes        Met with patient to discuss discharge planning s/p admission. Patient has dementia, TCC contacted patient's daughter Linda to discuss discharge planning. Patient lives at the [facility]. She had a recent fall at the facility. Patient will need transportation back to facility. Demographics and contact information confirmed.  Will continue to monitor patient for all home going needs.        Payor: Tanvi Gaona     Discharge disposition:assisted living     Potential Barriers: PT     ADOD: 1-2  days     Previous Home Care:  none  DME: None     Pharmacy: facility     Falls: None     PCP:  PRIYANK Robb, ELIJAHS, RN, TCC.

## 2024-10-20 NOTE — PROGRESS NOTES
"Casie Obrien is a 74 y.o. female on day 1 of admission presenting with Fall, initial encounter.    Subjective   NAEO, no concerns from nursing.    Pt. is doing well this morning.     Review of systems: negative, except as above.      Objective   Last Recorded Vitals  Blood pressure 155/52, pulse 57, temperature 36.7 °C (98.1 °F), temperature source Tympanic, resp. rate 16, height 1.651 m (5' 5\"), weight 65.4 kg (144 lb 2.9 oz), SpO2 100%.    Intake/Output last 3 Shifts:  I/O last 3 completed shifts:  In: - (0 mL/kg)   Out: 100 (1.5 mL/kg) [Urine:100 (0 mL/kg/hr)]  Weight: 65.4 kg     Physical Exam  Vitals reviewed.   Constitutional:       General: She is not in acute distress.     Appearance: Normal appearance. She is not toxic-appearing.   HENT:      Head: Normocephalic and atraumatic.      Mouth/Throat:      Mouth: Mucous membranes are moist.   Eyes:      Extraocular Movements: Extraocular movements intact.   Cardiovascular:      Rate and Rhythm: Normal rate and regular rhythm.      Pulses: Normal pulses.      Heart sounds: Murmur heard.      Comments: Systolic ejection murmur appreciated  Pulmonary:      Effort: Pulmonary effort is normal. No respiratory distress.      Breath sounds: Normal breath sounds. No stridor. No wheezing, rhonchi or rales.   Abdominal:      General: Abdomen is flat. Bowel sounds are normal.      Palpations: Abdomen is soft.      Tenderness: There is no abdominal tenderness. There is no guarding.   Musculoskeletal:      Right lower leg: No edema.      Left lower leg: No edema.   Skin:     General: Skin is warm.      Capillary Refill: Capillary refill takes less than 2 seconds.   Neurological:      General: No focal deficit present.      Mental Status: She is alert.      Comments: A&Ox1 (person)     Relevant Results  Scheduled medications  acetaminophen, 975 mg, oral, TID  amiodarone, 200 mg, oral, Daily  apixaban, 5 mg, oral, BID  atorvastatin, 80 mg, oral, Nightly  furosemide, 10 mg, " oral, Daily  hydrALAZINE, 50 mg, oral, TID  ipratropium, 2 spray, Each Nostril, q12h  iron sucrose, 200 mg, intravenous, Daily  losartan, 100 mg, oral, Daily  mirtazapine, 7.5 mg, oral, Nightly  polyethylene glycol, 17 g, oral, Daily  sertraline, 50 mg, oral, Daily    Continuous medications     PRN medications  PRN medications: cyclobenzaprine, diclofenac sodium, methocarbamol      Assessment/Plan   Fall, initial encounter    Casie Obrien is a 74 y.o. female  with PMH of HFrecEF, CAD (RCA  in 2020, no previous PCI), HTN, HLD, AFlutter s/p DCCV on 3/16/22 (on [facility]), R breast Ca s/p mastectomy, dementia, fall resulting in L1 compression fracture, dementia who is admitted for syncope c/b fall. She has not suffered complications from fall and serious etiologies have been rules out. This was likely a mechanical fall. She is medically ready for discharge and PT has assessed pt. and recommends SNF.     10/20/24 Updates  - Discharge tomorrow    Acute problems  # Syncope  # Iron Deficiency Anemia  - CTH noncon: No acute intracranial abnormality or calvarial fracture  - CT C/T/L spine noncon  - Cervical: No acute fracture or traumatic malalignment of the cervical spine. Spondylotic changes.  - Thoracic: No CT evidence of acute thoracic vertebral body or posterior element fracture. Osteopenia.  - Lumbar: Compression fracture of L1 without evidence of retropulsion, visualized on previous radiograph 2/2024. No evidence of new lumbar spinal fracture. Radiolucencies observed in the L3, L4, and L5 vertebral bodies, which are circumscribed with sclerotic margins and likely representative of a benign process.  - CT CAP w/ contrast: No acute abnormality in the chest. 4-chamber cardiomegaly with moderate to severe coronary artery calcifications. Chronic lung changes better evaluated on high-resolution CT chest  dated 03/22/2024. Significant calcific disease of the origin of the left subclavian artery causing moderate to severe  stenosis. No acute abnormality in the abdomen or pelvis.  - CXR: Prominent interstitial and patchy airspace opacities, which could represent mild pulmonary edema. No evidence of focal consolidation or pneumothorax  - BNP: 339  - Trop: 48 --> 52  - EKG in ED: NSR w/ 1st degree AV block and LVH  - Last echo: TTE 4/2024: HFpEF, EF of 55%, LA severely dilated, mild AI, severe LVH, and inferoapical hypo- to akinesia and inferolateral hypokinesia. There is anterobasal septal hypertrophy with a sigmoid septum  - Hgb in ED: 8.4 (previously in 6/2022: 12.3)  - Well's score for PE: 0  - Orthostats: negative  - Iron panel: iron deficiency  - Retic count: hypoproliferative  Plan  - Continue IV iron sucrose and discharge on PO iron sulfate     # H/o Multiple Falls  - PT consulted: recommended moderate intensity level of care  - Risk vs. Benefit conversation with family and patient about utilization of anticoagulant i/s/o multiple falls     Chronic problems  # HFrecEF  # HTN  - Continue home losartan and hydralazine  - Restarted home lasix     # CAD (RCA  in 2020, no previous PCI)  # HLD  - Not on ASA at home  - Continue home atorvastatin     # Aflutter (on Eliquis)  # H/o TIA  - Continue home amiodarone  - Holding Eliquis given possible bleed     # COPD  - Continue home ipratropium     # Osteoarthritis  # Chronic Low Back Pain  - Pain regimen:              - Scheduled PO Tylenol 975mg TID              - PRN Voltaren gel QID PRN              - Restarted home cyclobenzaprine and methocarbamol     # Insomnia  - Restarted home mirtazapine     # Anxiety  # Depression  - Continue home sertraline     # Tobacco Use  - Continue home varenicline and nicotine gum     # Allergic Rhinitis  - Continue home loratadine     Prevention of hospital-associated complications:  DVT Ppx: Eliquis  Diet: Regular  GI PPx: N/A  Bowel Regimen: Scheduled Miralax  Borges: No  IVF: None  Access/Lines: PIVs  Telemetry: No  Physical Therapy Consult Needed:  Yes  Antimicrobials: None     DISPOSITION: SNF     CODE STATUS: Full Code     NOK: Linda Obrien (Daughter) [phone]         Jacob Prasad MD  PGY-1 Resident Physician  Department of Anesthesiology & Perioperative Medicine

## 2024-10-20 NOTE — PROGRESS NOTES
Physical Therapy    Physical Therapy Evaluation    Patient Name: Casie Obrien  MRN: [id number]  Today's Date: 10/20/2024   Time Calculation  Start Time: 1109  Stop Time: 1119  Time Calculation (min): 10 min    Assessment/Plan   PT Assessment  PT Assessment Results: Decreased strength, Decreased endurance, Impaired balance, Decreased mobility, Decreased cognition, Decreased safety awareness  Rehab Prognosis: Good  Barriers to Discharge: none  End of Session Communication: Bedside nurse  Assessment Comment: Pt presents with decreased strength, balance, mobility, and safety awareness. Pt would benefit from PT to address the above deficits.  End of Session Patient Position: Bed, 3 rail up, Alarm on  IP OR SWING BED PT PLAN  Inpatient or Swing Bed: Inpatient  PT Plan  Treatment/Interventions: Bed mobility, Transfer training, Gait training, Balance training, Strengthening  PT Plan: Ongoing PT  PT Frequency: 3 times per week  PT Discharge Recommendations: Moderate intensity level of continued care  PT Recommended Transfer Status: Assist x1, Assistive device  PT - OK to Discharge: Yes      Subjective   General Visit Information:  Reason for Referral: syncope and fall  Past Medical History Relevant to Rehab: HFrecEF, CAD (RCA  in 2020, no previous PCI), HTN, HLD, AFlutter s/p DCCV on 3/16/22 (on Eliquis), R breast Ca s/p mastectomy, dementia, fall resulting in L1 compression fracture, dementia  Prior to Session Communication: Bedside nurse  Patient Position Received: Bed, 3 rail up, Alarm on   Home Living:  Home Living  Type of Home: Long Term Care facility  Prior Level of Function:  Prior Function Per Pt/Caregiver Report  ADL Assistance:  (pt reports independent)  Ambulatory Assistance:  (reports independent with a cane)  Precautions:  Precautions  Medical Precautions: Fall precautions       Objective     Pain:  Pain Assessment  Pain Assessment: 0-10  0-10 (Numeric) Pain Score: 0 - No pain  Cognition:  Cognition  Overall  Cognitive Status: Impaired  Orientation Level: Disoriented to place, Disoriented to time, Disoriented to situation      Extremity/Trunk Assessments:  Strength:     RUE   RUE : Exceptions to WFL  RUE Strength  RUE Overall Strength: Within Functional Limits - strength 5/5  LUE   LUE: Exceptions to WFL  LUE Strength  LUE Overall Strength: Greater than or equal to 3/5 as evidenced by functional mobility  RLE   RLE : Within Functional Limits  LLE   LLE : Within Functional Limits    General Assessments:            Sensation  Light Touch: No apparent deficits     Static Sitting Balance  Static Sitting-Level of Assistance: Close supervision  Dynamic Sitting Balance  Dynamic Sitting-Level of Assistance: Close supervision  Static Standing Balance  Static Standing-Level of Assistance: Contact guard (with walker)  Dynamic Standing Balance  Dynamic Standing-Level of Assistance: Minimum assistance (with walker)    Functional Assessments:  Bed Mobility  Bed Mobility: Yes  Bed Mobility 1  Bed Mobility 1: Supine to sitting, Sitting to supine  Level of Assistance 1: Contact guard  Bed Mobility Comments 1: HOB elevated, verbal cues  Transfers  Transfer: Yes  Transfer 1  Technique 1: Sit to stand, Stand to sit  Transfer Device 1: Walker  Transfer Level of Assistance 1: Minimum assistance  Trials/Comments 1: retropulsive initially upon standing, verbal cues for hand placement  Ambulation/Gait Training  Ambulation/Gait Training Performed: Yes  Ambulation/Gait Training 1  Device 1: Rolling walker  Assistance 1: Contact guard  Quality of Gait 1: Decreased step length (decreased juan carlos, verbal cues for safe use of walker and to avoid objects in path; required min assist while turning)  Comments/Distance (ft) 1: 100 feet          Outcome Measures:  [facility] Basic Mobility  Turning from your back to your side while in a flat bed without using bedrails: A little  Moving from lying on your back to sitting on the side of a flat bed without using  bedrails: A little  Moving to and from bed to chair (including a wheelchair): A little  Standing up from a chair using your arms (e.g. wheelchair or bedside chair): A little  To walk in hospital room: A little  Climbing 3-5 steps with railing: Total  Basic Mobility - Total Score: 16                               Encounter Problems       Encounter Problems (Active)       Balance       Pt will score >25/28 on the Tinetti Mobility Outcome Assessment (combined gait and balance) in order to demonstrate low fall risk        Start:  10/20/24    Expected End:  11/11/24               Mobility       STG - Patient will ambulate 150 feet with LRD with supervision       Start:  10/20/24    Expected End:  11/11/24               PT Transfers       STG - Patient will perform bed mobility independent        Start:  10/20/24    Expected End:  11/11/24            STG - Patient will transfer sit to and from stand with LRD with supervision       Start:  10/20/24    Expected End:  11/11/24                   Education Documentation  Mobility Training, taught by Inez Messina PT at 10/20/2024 11:37 AM.  Learner: Patient  Readiness: Acceptance  Method: Explanation  Response: Needs Reinforcement  Comment: bed, transfers, ambulation    Precautions, taught by Inez Messina PT at 10/20/2024 11:36 AM.  Learner: Patient  Readiness: Acceptance  Method: Explanation  Response: Needs Reinforcement  Comment: falls    Education Comments  No comments found.            10/20/24 at 11:37 AM   Inez Messina PT

## 2024-10-21 ENCOUNTER — APPOINTMENT (OUTPATIENT)
Dept: CARDIOLOGY | Facility: HOSPITAL | Age: 74
End: 2024-10-21
Payer: MEDICARE

## 2024-10-21 VITALS
WEIGHT: 144.18 LBS | OXYGEN SATURATION: 98 % | HEART RATE: 66 BPM | HEIGHT: 65 IN | SYSTOLIC BLOOD PRESSURE: 134 MMHG | BODY MASS INDEX: 24.02 KG/M2 | DIASTOLIC BLOOD PRESSURE: 54 MMHG | TEMPERATURE: 97.3 F | RESPIRATION RATE: 18 BRPM

## 2024-10-21 LAB
ALBUMIN SERPL BCP-MCNC: 3.8 G/DL (ref 3.4–5)
ANION GAP SERPL CALC-SCNC: 11 MMOL/L (ref 10–20)
AORTIC VALVE MEAN GRADIENT: 23 MMHG
AORTIC VALVE PEAK VELOCITY: 3.49 M/S
AV PEAK GRADIENT: 48.7 MMHG
AVA (PEAK VEL): 1.5 CM2
AVA (VTI): 1.73 CM2
BUN SERPL-MCNC: 15 MG/DL (ref 6–23)
CALCIUM SERPL-MCNC: 9.1 MG/DL (ref 8.6–10.6)
CHLORIDE SERPL-SCNC: 104 MMOL/L (ref 98–107)
CO2 SERPL-SCNC: 27 MMOL/L (ref 21–32)
CREAT SERPL-MCNC: 0.97 MG/DL (ref 0.5–1.05)
EGFRCR SERPLBLD CKD-EPI 2021: 61 ML/MIN/1.73M*2
EJECTION FRACTION APICAL 4 CHAMBER: 50.2
EJECTION FRACTION: 55 %
ERYTHROCYTE [DISTWIDTH] IN BLOOD BY AUTOMATED COUNT: 18.6 % (ref 11.5–14.5)
GLUCOSE SERPL-MCNC: 77 MG/DL (ref 74–99)
HCT VFR BLD AUTO: 25.6 % (ref 36–46)
HGB BLD-MCNC: 7.9 G/DL (ref 12–16)
LEFT ATRIUM VOLUME AREA LENGTH INDEX BSA: 68.6 ML/M2
LEFT VENTRICLE INTERNAL DIMENSION DIASTOLE: 4.45 CM (ref 3.5–6)
LEFT VENTRICULAR OUTFLOW TRACT DIAMETER: 2.1 CM
MCH RBC QN AUTO: 27.1 PG (ref 26–34)
MCHC RBC AUTO-ENTMCNC: 30.9 G/DL (ref 32–36)
MCV RBC AUTO: 88 FL (ref 80–100)
NRBC BLD-RTO: 0 /100 WBCS (ref 0–0)
PHOSPHATE SERPL-MCNC: 3.5 MG/DL (ref 2.5–4.9)
PLATELET # BLD AUTO: 260 X10*3/UL (ref 150–450)
POTASSIUM SERPL-SCNC: 4 MMOL/L (ref 3.5–5.3)
RBC # BLD AUTO: 2.92 X10*6/UL (ref 4–5.2)
SODIUM SERPL-SCNC: 138 MMOL/L (ref 136–145)
WBC # BLD AUTO: 3.7 X10*3/UL (ref 4.4–11.3)

## 2024-10-21 PROCEDURE — 2500000002 HC RX 250 W HCPCS SELF ADMINISTERED DRUGS (ALT 637 FOR MEDICARE OP, ALT 636 FOR OP/ED)

## 2024-10-21 PROCEDURE — 93308 TTE F-UP OR LMTD: CPT | Performed by: STUDENT IN AN ORGANIZED HEALTH CARE EDUCATION/TRAINING PROGRAM

## 2024-10-21 PROCEDURE — 93325 DOPPLER ECHO COLOR FLOW MAPG: CPT

## 2024-10-21 PROCEDURE — 99239 HOSP IP/OBS DSCHRG MGMT >30: CPT | Performed by: INTERNAL MEDICINE

## 2024-10-21 PROCEDURE — 2500000004 HC RX 250 GENERAL PHARMACY W/ HCPCS (ALT 636 FOR OP/ED)

## 2024-10-21 PROCEDURE — 85027 COMPLETE CBC AUTOMATED: CPT

## 2024-10-21 PROCEDURE — 36415 COLL VENOUS BLD VENIPUNCTURE: CPT

## 2024-10-21 PROCEDURE — 93325 DOPPLER ECHO COLOR FLOW MAPG: CPT | Performed by: STUDENT IN AN ORGANIZED HEALTH CARE EDUCATION/TRAINING PROGRAM

## 2024-10-21 PROCEDURE — 2500000001 HC RX 250 WO HCPCS SELF ADMINISTERED DRUGS (ALT 637 FOR MEDICARE OP)

## 2024-10-21 PROCEDURE — 93321 DOPPLER ECHO F-UP/LMTD STD: CPT | Performed by: STUDENT IN AN ORGANIZED HEALTH CARE EDUCATION/TRAINING PROGRAM

## 2024-10-21 PROCEDURE — G0378 HOSPITAL OBSERVATION PER HR: HCPCS

## 2024-10-21 PROCEDURE — 80069 RENAL FUNCTION PANEL: CPT

## 2024-10-21 PROCEDURE — 2500000005 HC RX 250 GENERAL PHARMACY W/O HCPCS

## 2024-10-21 RX ADMIN — IPRATROPIUM BROMIDE 2 SPRAY: 21 SPRAY, METERED NASAL at 12:52

## 2024-10-21 RX ADMIN — APIXABAN 5 MG: 5 TABLET, FILM COATED ORAL at 09:07

## 2024-10-21 RX ADMIN — ACETAMINOPHEN 975 MG: 325 TABLET ORAL at 09:07

## 2024-10-21 RX ADMIN — IPRATROPIUM BROMIDE 2 SPRAY: 21 SPRAY, METERED NASAL at 01:03

## 2024-10-21 RX ADMIN — HYDRALAZINE HYDROCHLORIDE 50 MG: 25 TABLET ORAL at 09:07

## 2024-10-21 RX ADMIN — QUETIAPINE FUMARATE 25 MG: 25 TABLET ORAL at 00:02

## 2024-10-21 RX ADMIN — ACETAMINOPHEN 975 MG: 325 TABLET ORAL at 14:24

## 2024-10-21 RX ADMIN — SERTRALINE 50 MG: 50 TABLET, FILM COATED ORAL at 09:07

## 2024-10-21 RX ADMIN — FUROSEMIDE 10 MG: 20 TABLET ORAL at 09:06

## 2024-10-21 RX ADMIN — AMIODARONE HYDROCHLORIDE 200 MG: 200 TABLET ORAL at 09:07

## 2024-10-21 RX ADMIN — LOSARTAN POTASSIUM 100 MG: 50 TABLET, FILM COATED ORAL at 09:06

## 2024-10-21 RX ADMIN — POLYETHYLENE GLYCOL 3350 17 G: 17 POWDER, FOR SOLUTION ORAL at 09:06

## 2024-10-21 RX ADMIN — HYDRALAZINE HYDROCHLORIDE 50 MG: 25 TABLET ORAL at 14:24

## 2024-10-21 ASSESSMENT — COGNITIVE AND FUNCTIONAL STATUS - GENERAL
DAILY ACTIVITIY SCORE: 19
STANDING UP FROM CHAIR USING ARMS: A LITTLE
MOBILITY SCORE: 19
DRESSING REGULAR LOWER BODY CLOTHING: A LITTLE
CLIMB 3 TO 5 STEPS WITH RAILING: A LOT
WALKING IN HOSPITAL ROOM: A LITTLE
DRESSING REGULAR UPPER BODY CLOTHING: A LITTLE
MOVING TO AND FROM BED TO CHAIR: A LITTLE
HELP NEEDED FOR BATHING: A LITTLE
TOILETING: A LITTLE
PERSONAL GROOMING: A LITTLE

## 2024-10-21 ASSESSMENT — PAIN SCALES - GENERAL: PAINLEVEL_OUTOF10: 0 - NO PAIN

## 2024-10-21 NOTE — NURSING NOTE
I Attempted  3 times to call [facility] to give nurse to nurse report and no answer or answering machine to leave a message.

## 2024-10-21 NOTE — CARE PLAN
The patient's goals for the shift include      The clinical goals for the shift include pt will remain free from falls/injuries during this shift    Over the shift, the patient did not make progress toward the following goals. Barriers to progression include  Recommendations to address these barriers include

## 2024-10-21 NOTE — PROGRESS NOTES
Casie Obrien is a 74 y.o. y.o. female on day 2 of admission presenting with Anticoagulated [Z79.01]  Closed head injury, initial encounter [S09.90XA]  Fall, initial encounter [W19.XXXA]  Anemia, unspecified type [D64.9].     Subjective      10/21/24 1443   Discharge Planning   Home or Post Acute Services Post acute facilities (Rehab/SNF/etc)   Type of Post Acute Facility Services Assisted living  ([facility]: [phone])   Expected Discharge Disposition [facility]  (Assisted Living)   Does the patient need discharge transport arranged? Yes   RoundTrip coordination needed? Yes   Has discharge transport been arranged? Yes   What day is the transport expected? 10/21/24   What time is the transport expected? 1730  (via [facility])     Chart reviewed; noted that patient admitted from [facility]. Patient was evaluated by PT and recommended for moderate intensity therapy at discharge.    Spoke with Letty, Director of Nursing at [facility]; discussed therapy recommendations and she confirmed that they are able to accommodate the patient back at their facility. She requested that copy of AVS and PT/OT orders be faxed to her at [phone].     Transportation requested in Roundtrip and received confirmed  time of 5:30pm via [facility]. Attempted to notify Letty of time but had to leave voicemail. Blue slip completed and provided to unit secretary.    Call placed to patients daughter Linda ([phone]) and notified her of planned discharge this afternoon; she denied further questions or concerns at this time.     - Stacy ESQUIVEL MA, [facility]  Care Transitions   [facility] Secure Chat or [phone]

## 2024-10-21 NOTE — CARE PLAN
The clinical goals for the shift include Patient will remain free from falls, by the end of this shift.    Patient remained free from injuries during this shift.    Patient is in bed, HOB elevated, call light within reach, bed alarm active and audible.     Patient pulled out IV. MD notified. MD said no new IV was needed, since patient is being discharged today.    Plan of care ongoing.

## 2024-10-21 NOTE — HOSPITAL COURSE
Casie Obrien is a 74 y.o. female presenting with syncope and fall. She was brought in by EMS after nursing staff found her on the ground at her SNF. Apparently she was trying to get out of bed and fell. She is a poor historian given her dementia, but she does not recall any specific feels, sensations, or symptoms prior to losing consciousness. No report of chest pain, palpitations, shortness of breath, lightheadedness, dizziness, or urinary incontinence. Post-ictal state difficult to assess given her dementia and lack of collateral information.     In the ED, CTH noncon and CT C/T/L noncon obtained and ruled out brain bleed and spinal fracture except for L1 compression fracture. EKG and trops ruled out ACS. BNP only mildly elevated and CXR showed mild pulmonary edema. Last TTE in May showed HFpEF. Orthostats negative. Hgb ~4 points down since last measurement 2 years prior. CT CAP obtained and showed no acute bleed. Iron studies send and showed CAMPOS, so IV iron was given and she was discharged on PO iron tablets. Fall was most likely mechanical in nature. PT saw pt. prior to discharge and recommended SNF. Hospitalization was uncomplicated.

## 2024-10-21 NOTE — PROGRESS NOTES
"Casie Obrien is a 74 y.o. female on day 2 of admission presenting with Fall, initial encounter.    Subjective   No acute events overnight.  Patient is medically stable for discharge.  Patient received recommendation per PT/OT for SNF which is upholding discharge.  Patient is resting comfortably and not in acute distress.      Objective   Last Recorded Vitals  Blood pressure 128/70, pulse 69, temperature 36.6 °C (97.9 °F), temperature source Tympanic, resp. rate 16, height 1.651 m (5' 5\"), weight 65.4 kg (144 lb 2.9 oz), SpO2 97%.    Intake/Output last 3 Shifts:  I/O last 3 completed shifts:  In: - (0 mL/kg)   Out: 100 (1.5 mL/kg) [Urine:100 (0 mL/kg/hr)]  Weight: 65.4 kg     Physical Exam  Vitals reviewed.   Constitutional:       General: She is not in acute distress.     Appearance: Normal appearance. She is not toxic-appearing.   HENT:      Head: Normocephalic and atraumatic.      Mouth/Throat:      Mouth: Mucous membranes are moist.   Eyes:      Extraocular Movements: Extraocular movements intact.   Cardiovascular:      Rate and Rhythm: Normal rate and regular rhythm.      Pulses: Normal pulses.      Heart sounds: Murmur heard.      Comments: Systolic ejection murmur appreciated  Pulmonary:      Effort: Pulmonary effort is normal. No respiratory distress.      Breath sounds: Normal breath sounds. No stridor. No wheezing, rhonchi or rales.   Abdominal:      General: Abdomen is flat. Bowel sounds are normal.      Palpations: Abdomen is soft.      Tenderness: There is no abdominal tenderness. There is no guarding.   Musculoskeletal:      Right lower leg: No edema.      Left lower leg: No edema.   Skin:     General: Skin is warm.      Capillary Refill: Capillary refill takes less than 2 seconds.   Neurological:      General: No focal deficit present.      Mental Status: She is alert.      Comments: A&Ox1 (person)     Relevant Results  Scheduled medications  acetaminophen, 975 mg, oral, TID  amiodarone, 200 mg, " oral, Daily  apixaban, 5 mg, oral, BID  atorvastatin, 80 mg, oral, Nightly  furosemide, 10 mg, oral, Daily  hydrALAZINE, 50 mg, oral, TID  ipratropium, 2 spray, Each Nostril, q12h  iron sucrose, 200 mg, intravenous, Daily  losartan, 100 mg, oral, Daily  mirtazapine, 7.5 mg, oral, Nightly  polyethylene glycol, 17 g, oral, Daily  sertraline, 50 mg, oral, Daily    Continuous medications     PRN medications  PRN medications: cyclobenzaprine, diclofenac sodium, methocarbamol      Assessment/Plan   Fall, initial encounter    Casie Obrien is a 74 y.o. female  with PMH of HFrecEF, CAD (RCA  in 2020, no previous PCI), HTN, HLD, AFlutter s/p DCCV on 3/16/22 (on Eliquis), R breast Ca s/p mastectomy, dementia, fall resulting in L1 compression fracture, dementia who is admitted for syncope c/b fall. She has not suffered complications from fall and serious etiologies have been rules out. This was likely a mechanical fall. She is medically ready for discharge and PT has assessed pt. and recommends SNF.     10/21/24 Updates  -Plan for discharge on 10/21, pending resolution of recommendations for SNF by PT/OT.    Acute problems  # Syncope  # Iron Deficiency Anemia  - CTH noncon: No acute intracranial abnormality or calvarial fracture  - CT C/T/L spine noncon  - Cervical: No acute fracture or traumatic malalignment of the cervical spine. Spondylotic changes.  - Thoracic: No CT evidence of acute thoracic vertebral body or posterior element fracture. Osteopenia.  - Lumbar: Compression fracture of L1 without evidence of retropulsion, visualized on previous radiograph 2/2024. No evidence of new lumbar spinal fracture. Radiolucencies observed in the L3, L4, and L5 vertebral bodies, which are circumscribed with sclerotic margins and likely representative of a benign process.  - CT CAP w/ contrast: No acute abnormality in the chest. 4-chamber cardiomegaly with moderate to severe coronary artery calcifications. Chronic lung changes  better evaluated on high-resolution CT chest  dated 03/22/2024. Significant calcific disease of the origin of the left subclavian artery causing moderate to severe stenosis. No acute abnormality in the abdomen or pelvis.  - CXR: Prominent interstitial and patchy airspace opacities, which could represent mild pulmonary edema. No evidence of focal consolidation or pneumothorax  - BNP: 339  - Trop: 48 --> 52  - EKG in ED: NSR w/ 1st degree AV block and LVH  - Last echo: TTE 4/2024: HFpEF, EF of 55%, LA severely dilated, mild AI, severe LVH, and inferoapical hypo- to akinesia and inferolateral hypokinesia. There is anterobasal septal hypertrophy with a sigmoid septum  - Hgb in ED: 8.4 (previously in 6/2022: 12.3)  - Well's score for PE: 0  - Orthostats: negative  - Iron panel: iron deficiency  - Retic count: hypoproliferative  Plan  - Continue IV iron sucrose and discharge on PO iron sulfate     # H/o Multiple Falls  - PT consulted: recommended moderate intensity level of care  - Risk vs. Benefit conversation with family and patient about utilization of anticoagulant i/s/o multiple falls     Chronic problems  # HFrecEF  # HTN  - Continue home losartan and hydralazine  - Restarted home lasix     # CAD (RCA  in 2020, no previous PCI)  # HLD  - Not on ASA at home  - Continue home atorvastatin     # Aflutter (on Eliquis)  # H/o TIA  - Continue home amiodarone  - Holding Eliquis given possible bleed     # COPD  - Continue home ipratropium     # Osteoarthritis  # Chronic Low Back Pain  - Pain regimen:              - Scheduled PO Tylenol 975mg TID              - PRN Voltaren gel QID PRN              - Restarted home cyclobenzaprine and methocarbamol     # Insomnia  - Restarted home mirtazapine     # Anxiety  # Depression  - Continue home sertraline     # Tobacco Use  - Continue home varenicline and nicotine gum     # Allergic Rhinitis  - Continue home loratadine     Prevention of hospital-associated complications:  DVT Ppx:  Eliquis  Diet: Regular  GI PPx: N/A  Bowel Regimen: Scheduled Miralax  Borges: No  IVF: None  Access/Lines: PIVs  Telemetry: No  Physical Therapy Consult Needed: Yes  Antimicrobials: None     DISPOSITION: SNF     CODE STATUS: Full Code     NOK: Linda Obrien (Daughter) [phone]         Jacob Prasad MD  PGY-1 Resident Physician  Department of Anesthesiology & Perioperative Medicine

## 2024-10-22 NOTE — DISCHARGE SUMMARY
Discharge Diagnosis  Fall, initial encounter  L1 Compression Fracture  Dementia  Iron deficiency anemia    Discharge Meds     Medication List      START taking these medications     ferrous gluconate 324 (38 Fe) mg tablet; Commonly known as: Fergon; Take   1 tablet (38 mg of iron) by mouth every other day.     CONTINUE taking these medications     acetaminophen 650 mg ER tablet; Commonly known as: Tylenol 8 HOUR   amiodarone 200 mg tablet; Commonly known as: Pacerone   apixaban 5 mg tablet; Commonly known as: Eliquis   atorvastatin 80 mg tablet; Commonly known as: Lipitor   furosemide 20 mg tablet; Commonly known as: Lasix   hydrALAZINE 50 mg tablet; Commonly known as: Apresoline   ipratropium 21 mcg (0.03 %) nasal spray; Commonly known as: Atrovent   loratadine 10 mg tablet; Commonly known as: Claritin   losartan 100 mg tablet; Commonly known as: Cozaar   methocarbamol 500 mg tablet; Commonly known as: Robaxin; Take 1-2 tabs   every 8 hours as needed for spasms   mirtazapine 7.5 mg tablet; Commonly known as: Remeron   nicotine polacrilex 4 mg gum; Commonly known as: Nicorette   sertraline 50 mg tablet; Commonly known as: Zoloft   varenicline 1 mg tablet; Commonly known as: [facility] Course  Casie Obrien is a 74 y.o. female presenting with syncope and fall. She was brought in by EMS after nursing staff found her on the ground at her SNF. Apparently she was trying to get out of bed and fell. She is a poor historian given her dementia, but she does not recall any specific feels, sensations, or symptoms prior to losing consciousness. No report of chest pain, palpitations, shortness of breath, lightheadedness, dizziness, or urinary incontinence. Post-ictal state difficult to assess given her dementia and lack of collateral information.     In the ED, CTH noncon and CT C/T/L noncon obtained and ruled out brain bleed and spinal fracture except for L1 compression fracture. EKG and trops ruled out ACS. BNP  only mildly elevated and CXR showed mild pulmonary edema. Last TTE in May showed HFpEF. Orthostats negative. Hgb ~4 points down since last measurement 2 years prior. CT CAP obtained and showed no acute bleed. Iron studies send and showed CAMPOS, so IV iron was given and she was discharged on PO iron tablets. Fall was most likely mechanical in nature. PT saw pt. prior to discharge and recommended SNF. Hospitalization was uncomplicated.    Pertinent Physical Exam At Time of Discharge  Physical Exam  Constitutional:       General: She is not in acute distress.     Appearance: Normal appearance. She is not toxic-appearing.   HENT:      Head: Normocephalic and atraumatic.      Mouth/Throat:      Mouth: Mucous membranes are moist.   Eyes:      Extraocular Movements: Extraocular movements intact.   Cardiovascular:      Rate and Rhythm: Normal rate and regular rhythm.      Pulses: Normal pulses.      Heart sounds: Murmur heard.      Comments: Systolic ejection murmur appreciated  Pulmonary:      Effort: Pulmonary effort is normal. No respiratory distress.      Breath sounds: Normal breath sounds. No stridor. No wheezing, rhonchi or rales.   Abdominal:      General: Abdomen is flat. Bowel sounds are normal.      Palpations: Abdomen is soft.      Tenderness: There is no abdominal tenderness. There is no guarding.   Musculoskeletal:      Right lower leg: No edema.      Left lower leg: No edema.   Skin:     General: Skin is warm.      Capillary Refill: Capillary refill takes less than 2 seconds.   Neurological:      General: No focal deficit present.      Mental Status: She is alert.      Comments: A&Ox1 (person)       Outpatient Follow-Up  No future appointments.      Shara Costa MD      Attending Attestation:   I saw and evaluated the patient. I personally obtained the key and critical portions of the history and physical exam or was physically present for key and critical portions performed by the resident. I reviewed the  resident's documentation and discussed the patient with the resident. I agree with the resident's medical decision making as documented in the note.      >35 minutes spent coordinating care of the patient     Verona Lira DO

## 2024-10-24 LAB — METHYLMALONATE SERPL-SCNC: 0.16 UMOL/L (ref 0–0.4)

## 2025-03-25 ENCOUNTER — APPOINTMENT (OUTPATIENT)
Dept: OPHTHALMOLOGY | Facility: CLINIC | Age: 75
End: 2025-03-25
Payer: MEDICARE